# Patient Record
Sex: FEMALE | Race: WHITE | NOT HISPANIC OR LATINO | Employment: OTHER | URBAN - METROPOLITAN AREA
[De-identification: names, ages, dates, MRNs, and addresses within clinical notes are randomized per-mention and may not be internally consistent; named-entity substitution may affect disease eponyms.]

---

## 2018-06-16 ENCOUNTER — HOSPITAL ENCOUNTER (EMERGENCY)
Facility: HOSPITAL | Age: 53
Discharge: HOME | End: 2018-06-16
Attending: EMERGENCY MEDICINE
Payer: COMMERCIAL

## 2018-06-16 VITALS
OXYGEN SATURATION: 100 % | HEART RATE: 78 BPM | SYSTOLIC BLOOD PRESSURE: 141 MMHG | BODY MASS INDEX: 19.49 KG/M2 | DIASTOLIC BLOOD PRESSURE: 82 MMHG | HEIGHT: 63 IN | TEMPERATURE: 98 F | WEIGHT: 110 LBS

## 2018-06-16 DIAGNOSIS — S16.1XXA STRAIN OF NECK MUSCLE, INITIAL ENCOUNTER: ICD-10-CM

## 2018-06-16 DIAGNOSIS — S39.012A BACK STRAIN, INITIAL ENCOUNTER: Primary | ICD-10-CM

## 2018-06-16 DIAGNOSIS — M54.9 BACK PAIN: ICD-10-CM

## 2018-06-16 DIAGNOSIS — M54.2 NECK PAIN: ICD-10-CM
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
## 2018-06-16 DIAGNOSIS — R07.9 CHEST PAIN: ICD-10-CM

## 2018-06-16 PROCEDURE — 99283 EMERGENCY DEPT VISIT LOW MDM: CPT | Mod: 25

## 2018-06-16 RX ORDER — DICLOFENAC SODIUM 50 MG/1
50 TABLET, DELAYED RELEASE ORAL 3 TIMES DAILY PRN
Qty: 30 TABLET | Refills: 0 | Status: SHIPPED | OUTPATIENT
Start: 2018-06-16 | End: 2018-10-03

## 2018-06-16 RX ORDER — METHOCARBAMOL 500 MG/1
1000 TABLET, FILM COATED ORAL 3 TIMES DAILY PRN
Qty: 30 TABLET | Refills: 0 | Status: SHIPPED | OUTPATIENT
Start: 2018-06-16 | End: 2018-06-21
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
## 2018-06-16 NOTE — ED NOTES
Given written and verbal DC instructions questions answered per MD aware to follow up with PCP encouraged to return if needed. Given RX with teaching. Given detailed instructions to rest as needed, drink plenty of water, and listen to her body if it hurts to rest and take medications as directed.

## 2018-07-03 ENCOUNTER — TELEPHONE (OUTPATIENT)
Dept: RHEUMATOLOGY | Facility: CLINIC | Age: 53
End: 2018-07-03

## 2018-07-03 NOTE — TELEPHONE ENCOUNTER
----- Message from Mable Tony sent at 7/3/2018 10:25 AM CDT -----  Contact: Jessica Fitch  Office   Jessica Fitch  Office calling wanting to see of pt can be seen sooner than 10/3,please...[phone]

## 2018-10-03 ENCOUNTER — OFFICE VISIT (OUTPATIENT)
Dept: RHEUMATOLOGY | Facility: CLINIC | Age: 53
End: 2018-10-03

## 2018-10-03 VITALS
HEIGHT: 63 IN | DIASTOLIC BLOOD PRESSURE: 96 MMHG | HEART RATE: 82 BPM | SYSTOLIC BLOOD PRESSURE: 155 MMHG | WEIGHT: 111.13 LBS | BODY MASS INDEX: 19.69 KG/M2

## 2018-10-03 DIAGNOSIS — R53.83 FATIGUE, UNSPECIFIED TYPE: ICD-10-CM

## 2018-10-03 DIAGNOSIS — M35.00 SICCA COMPLEX: ICD-10-CM

## 2018-10-03 DIAGNOSIS — I73.00 RAYNAUD'S PHENOMENON WITHOUT GANGRENE: ICD-10-CM

## 2018-10-03 DIAGNOSIS — M25.50 POLYARTHRALGIA: Primary | ICD-10-CM

## 2018-10-03 DIAGNOSIS — M79.10 MYALGIA: ICD-10-CM

## 2018-10-03 PROCEDURE — 99213 OFFICE O/P EST LOW 20 MIN: CPT | Mod: PBBFAC,PO | Performed by: INTERNAL MEDICINE

## 2018-10-03 PROCEDURE — 99999 PR PBB SHADOW E&M-EST. PATIENT-LVL III: CPT | Mod: PBBFAC,,, | Performed by: INTERNAL MEDICINE

## 2018-10-03 PROCEDURE — 99205 OFFICE O/P NEW HI 60 MIN: CPT | Mod: S$PBB,,, | Performed by: INTERNAL MEDICINE

## 2018-10-03 RX ORDER — ALPRAZOLAM 1 MG/1
1 TABLET ORAL
COMMUNITY
End: 2020-01-13 | Stop reason: SDUPTHER

## 2018-10-03 RX ORDER — AMLODIPINE BESYLATE 5 MG/1
5 TABLET ORAL
COMMUNITY
End: 2020-01-13 | Stop reason: SDUPTHER

## 2018-10-03 RX ORDER — FLUTICASONE PROPIONATE 50 MCG
1 SPRAY, SUSPENSION (ML) NASAL
COMMUNITY
End: 2020-01-13 | Stop reason: SDUPTHER

## 2018-10-03 RX ORDER — PREDNISONE 5 MG/1
TABLET ORAL
COMMUNITY
Start: 2015-03-18 | End: 2018-10-03

## 2018-10-03 RX ORDER — NORGESTIMATE AND ETHINYL ESTRADIOL 0.25-0.035
1 KIT ORAL
COMMUNITY
Start: 2014-06-23 | End: 2023-11-02

## 2018-10-03 RX ORDER — OMEPRAZOLE 20 MG/1
20 TABLET, DELAYED RELEASE ORAL
COMMUNITY
End: 2020-01-13 | Stop reason: SDUPTHER

## 2018-10-03 RX ORDER — TRAMADOL HYDROCHLORIDE 50 MG/1
50 TABLET ORAL
COMMUNITY
End: 2020-01-13 | Stop reason: SDUPTHER

## 2018-10-03 RX ORDER — ZOLPIDEM TARTRATE 5 MG/1
5 TABLET ORAL
COMMUNITY
End: 2019-09-09

## 2018-10-03 RX ORDER — BIOTIN 10 MG
1 TABLET ORAL
COMMUNITY
End: 2018-10-03

## 2018-10-03 RX ORDER — LISINOPRIL 20 MG/1
20 TABLET ORAL
COMMUNITY
End: 2020-01-13 | Stop reason: SDUPTHER

## 2018-10-03 RX ORDER — METHOCARBAMOL 500 MG/1
500 TABLET, FILM COATED ORAL
COMMUNITY
Start: 2015-03-18 | End: 2020-01-13 | Stop reason: SDUPTHER

## 2018-10-03 RX ORDER — LEVOTHYROXINE SODIUM 25 UG/1
25 TABLET ORAL DAILY
COMMUNITY
End: 2021-08-25

## 2018-10-03 RX ORDER — GABAPENTIN 300 MG/1
300 CAPSULE ORAL 3 TIMES DAILY
COMMUNITY
End: 2020-01-13 | Stop reason: SDUPTHER

## 2018-10-03 RX ORDER — LANOLIN ALCOHOL/MO/W.PET/CERES
100 CREAM (GRAM) TOPICAL
COMMUNITY
End: 2018-10-31

## 2018-10-03 ASSESSMENT — ROUTINE ASSESSMENT OF PATIENT INDEX DATA (RAPID3)
MDHAQ FUNCTION SCORE: 1.1
PSYCHOLOGICAL DISTRESS SCORE: 3.3
WHEN YOU AWAKENED IN THE MORNING OVER THE LAST WEEK, PLEASE INDICATE THE AMOUNT OF TIME IT TAKES UNTIL YOU ARE AS LIMBER AS YOU WILL BE FOR THE DAY: FEW MIN
FATIGUE SCORE: 5
PAIN SCORE: 7
PATIENT GLOBAL ASSESSMENT SCORE: 8
TOTAL RAPID3 SCORE: 6.22
AM STIFFNESS SCORE: 1, YES

## 2018-10-03 NOTE — PROGRESS NOTES
"Subjective:       Patient ID: Nataly Traore is a 52 y.o. female.    Chief Complaint: Polyarthralgia   HPI 51 yo female with hypothyroidism and HTN is seen in consultation for joint pain. Saw Dr Muñoz in the past. Also, saw Dr Mccrary in 1990s. Diagnosed with Sjogren's syndrome and Fibromyalgia. Took HCQ in the past- caused rash.   Patient complains of polyarthralgia and myalgia for the last 30 years.Worsened since June 16, 2018. Pain is aching type, continuous, worse as the day progresses, worse with activity, better with rest.  Denies any joint effusion.  Early morning stiffness lasts for 30-60minutes  +SICCA symptoms +photosensitivity  +Raynaud's  She denies fever, weight loss,  rash, ulcers, patchy  alopecia, dysphagia, diarrhea or blood in the stools    Reviewed PMH, FH, SH and past labs    Review of Systems   Constitutional: Positive for fatigue. Negative for fever.   HENT: Negative for ear discharge and ear pain.    Eyes: Negative for pain and redness.   Respiratory: Negative for cough and shortness of breath.    Cardiovascular: Negative for chest pain and palpitations.   Gastrointestinal: Negative for abdominal distention and abdominal pain.   Genitourinary: Negative for genital sores and hematuria.   Musculoskeletal: Positive for arthralgias and myalgias.   Skin: Negative for color change and wound.   Neurological: Negative for tremors and seizures.   Psychiatric/Behavioral: Negative for agitation and hallucinations.         Objective:   BP (!) 155/96   Pulse 82   Ht 5' 3" (1.6 m)   Wt 50.4 kg (111 lb 1.8 oz)   LMP  (Exact Date)   BMI 19.68 kg/m²      Physical Exam   Nursing note and vitals reviewed.  Constitutional: She is oriented to person, place, and time and well-developed, well-nourished, and in no distress.   HENT:   Head: Normocephalic and atraumatic.   Eyes: Conjunctivae and EOM are normal. Pupils are equal, round, and reactive to light.   Neck: Normal range of motion. Neck supple. No " thyromegaly present.   Cardiovascular: Normal rate and regular rhythm.  Exam reveals no friction rub.    Pulmonary/Chest: Effort normal and breath sounds normal.   Abdominal: Soft. Bowel sounds are normal. She exhibits no mass.   Neurological: She is alert and oriented to person, place, and time. She exhibits normal muscle tone.   Skin: Skin is warm and dry.     Psychiatric: Memory and affect normal.   Musculoskeletal: She exhibits no edema.   ROM is within normal limits in all joints including shoulders, elbows, wrists, hands, hips, knees, ankles, feet and spine  Patient is non tender in all joints including shoulders, elbows, wrists, hands, hips, knees, ankles, feet and spine  No joint effusion  Strength is 5/5 in all ext, reflexes are 2+b/l                 Reviewed outside labs 8/30/2018  ESR 41, HB 11.6  WBC 3.3  NEYTROPHILS 2132 LYMPH 878    Radiology: I personally reviewed imaging  X-ray of [place] from 06/16/2018 revealed degenerative changes  Assessment:   Polyarthralgia- Rule out inflammatory arthritis  SICCA symptoms -Check SSA   Diffused myalgia-Rule out myositis  Fibromyalgia   Fatigue-Rule out vitamin d deficiency  Raynaud's phenomenon- primary vs secondary     PLAN:  Reviewed outside labs   Check AVRIL, SSA, RF, CCP, ESR, CRP  Check hand x ray   Check CPK, aldolase, 25 hydroxy Vit D   Check SCL  Patient counseled for Raynaud's- discussed lifestyle modification to keep fingers and toes warm   Return to clinic after lab review    -Patient seen face to face for 60minutes and greater than 50% spent in counseling regarding above diagnosis and chart review

## 2018-10-09 ENCOUNTER — LAB VISIT (OUTPATIENT)
Dept: LAB | Facility: HOSPITAL | Age: 53
End: 2018-10-09
Attending: INTERNAL MEDICINE

## 2018-10-09 DIAGNOSIS — M25.50 POLYARTHRALGIA: ICD-10-CM

## 2018-10-09 LAB
CK SERPL-CCNC: 29 U/L
ERYTHROCYTE [SEDIMENTATION RATE] IN BLOOD BY WESTERGREN METHOD: 20 MM/HR
RHEUMATOID FACT SERPL-ACNC: <10 IU/ML

## 2018-10-09 PROCEDURE — 36415 COLL VENOUS BLD VENIPUNCTURE: CPT

## 2018-10-09 PROCEDURE — 82550 ASSAY OF CK (CPK): CPT

## 2018-10-09 PROCEDURE — 85651 RBC SED RATE NONAUTOMATED: CPT

## 2018-10-09 PROCEDURE — 86200 CCP ANTIBODY: CPT

## 2018-10-09 PROCEDURE — 82306 VITAMIN D 25 HYDROXY: CPT

## 2018-10-09 PROCEDURE — 86235 NUCLEAR ANTIGEN ANTIBODY: CPT | Mod: 59

## 2018-10-09 PROCEDURE — 82085 ASSAY OF ALDOLASE: CPT

## 2018-10-09 PROCEDURE — 86431 RHEUMATOID FACTOR QUANT: CPT

## 2018-10-09 PROCEDURE — 86038 ANTINUCLEAR ANTIBODIES: CPT

## 2018-10-09 PROCEDURE — 86235 NUCLEAR ANTIGEN ANTIBODY: CPT

## 2018-10-10 LAB
25(OH)D3+25(OH)D2 SERPL-MCNC: 45 NG/ML
ALDOLASE SERPL-CCNC: 2.7 U/L
ANA SER QL IF: NORMAL
ANTI-SSA ANTIBODY: 51.68 EU
ANTI-SSA INTERPRETATION: POSITIVE
CCP AB SER IA-ACNC: <0.5 U/ML

## 2018-10-11 ENCOUNTER — TELEPHONE (OUTPATIENT)
Dept: RHEUMATOLOGY | Facility: CLINIC | Age: 53
End: 2018-10-11

## 2018-10-11 LAB — ENA SCL70 AB SER-ACNC: 7 UNITS

## 2018-10-11 NOTE — TELEPHONE ENCOUNTER
Spoke to pt informed verbalizes understanding states will have hand xrays completed soon and fax results to our office as she is cash pay and [facility] wanted to much. Pt also states she will call back to schedule as she is currently driving and dont have schedule available

## 2018-10-11 NOTE — TELEPHONE ENCOUNTER
----- Message from Dariana Leger MD sent at 10/11/2018  2:21 PM CDT -----  Crystal, Please let the patient know that results are positive for Sjogren's syndrome. RTC in 2 months to discuss further. Thanks SS

## 2018-10-31 ENCOUNTER — OFFICE VISIT (OUTPATIENT)
Dept: RHEUMATOLOGY | Facility: CLINIC | Age: 53
End: 2018-10-31

## 2018-10-31 VITALS
HEART RATE: 85 BPM | DIASTOLIC BLOOD PRESSURE: 90 MMHG | SYSTOLIC BLOOD PRESSURE: 129 MMHG | WEIGHT: 109.81 LBS | HEIGHT: 63 IN | BODY MASS INDEX: 19.46 KG/M2

## 2018-10-31 DIAGNOSIS — M35.01 SJOGREN'S SYNDROME WITH KERATOCONJUNCTIVITIS SICCA: ICD-10-CM

## 2018-10-31 DIAGNOSIS — D72.819 LEUKOPENIA, UNSPECIFIED TYPE: Primary | ICD-10-CM

## 2018-10-31 DIAGNOSIS — I73.81 ERYTHROMELALGIA: ICD-10-CM

## 2018-10-31 PROCEDURE — 99214 OFFICE O/P EST MOD 30 MIN: CPT | Mod: S$PBB,,, | Performed by: INTERNAL MEDICINE

## 2018-10-31 PROCEDURE — 99999 PR PBB SHADOW E&M-EST. PATIENT-LVL III: CPT | Mod: PBBFAC,,, | Performed by: INTERNAL MEDICINE

## 2018-10-31 PROCEDURE — 99213 OFFICE O/P EST LOW 20 MIN: CPT | Mod: PBBFAC,PO | Performed by: INTERNAL MEDICINE

## 2018-10-31 ASSESSMENT — ROUTINE ASSESSMENT OF PATIENT INDEX DATA (RAPID3)
WHEN YOU AWAKENED IN THE MORNING OVER THE LAST WEEK, PLEASE INDICATE THE AMOUNT OF TIME IT TAKES UNTIL YOU ARE AS LIMBER AS YOU WILL BE FOR THE DAY: 30 MIN-1 HR
PSYCHOLOGICAL DISTRESS SCORE: 4.4
AM STIFFNESS SCORE: 1, YES
TOTAL RAPID3 SCORE: 6.33
FATIGUE SCORE: 9
MDHAQ FUNCTION SCORE: 1.2
PATIENT GLOBAL ASSESSMENT SCORE: 7.5
PAIN SCORE: 7.5

## 2018-10-31 NOTE — PROGRESS NOTES
"Subjective:       Patient ID: Nataly Traore is a 52 y.o. female.    Chief Complaint: Sjogren syndrome  HPI 51 yo female with hypothyroidism and HTN is  Here for follow-up for Sjogren syndrome.Saw Dr Muñoz in the past. Also, saw Dr Mccrary in 1990s. Diagnosed with Sjogren's syndrome and Fibromyalgia. Took HCQ in the past- caused rash.      Today, she is concerned about symptoms of Erythromelalgia.  Complains of episodes of hand redness.During episodes,  Distal upper extremitiesbecome red, hot, and painful  Accompanied by swelling. The episodes are intermittent and generally last for minutes to hours before resolving.   +SICCA symptoms   +photosensitivity  +Raynaud's  She denies fever, weight loss, ulcers, patchy  alopecia, dysphagia, diarrhea or blood in the stools        Review of Systems   HENT: Negative for ear discharge and ear pain.    Eyes: Negative for pain and redness.   Respiratory: Negative for cough and shortness of breath.    Cardiovascular: Negative for chest pain and palpitations.   Gastrointestinal: Negative for abdominal distention and abdominal pain.   Genitourinary: Negative for genital sores and hematuria.   Musculoskeletal: Positive for arthralgias. Negative for joint swelling.   Skin: Positive for color change. Negative for wound.         Objective:   BP (!) 129/90 (BP Location: Left arm, Patient Position: Sitting)   Pulse 85   Ht 5' 3" (1.6 m)   Wt 49.8 kg (109 lb 12.6 oz)   LMP 10/09/2018   BMI 19.45 kg/m²      Physical Exam   Nursing note and vitals reviewed.  Constitutional: She is oriented to person, place, and time and well-developed, well-nourished, and in no distress.   HENT:   Head: Normocephalic and atraumatic.   Eyes: Conjunctivae and EOM are normal. Pupils are equal, round, and reactive to light.   Neck: Normal range of motion. Neck supple. No thyromegaly present.   Cardiovascular: Normal rate and regular rhythm.  Exam reveals no friction rub.    Pulmonary/Chest: Effort normal " and breath sounds normal.   Abdominal: Soft. Bowel sounds are normal.   Neurological: She is alert and oriented to person, place, and time.   Skin: Skin is warm and dry. No rash noted.     Psychiatric: Memory and affect normal.   Musculoskeletal: She exhibits no edema.     No joint effusion                     Lab Results   Component Value Date    SEDRATE 20 10/09/2018     No results found for: CRP  Results for ALISSA ROBLES (MRN [id number]) as of 10/31/2018 14:16   Ref. Range 10/9/2018 14:17 10/9/2018 16:21   Sed Rate Latest Ref Range: 0 - 20 mm/Hr 20    CPK Latest Ref Range: 20 - 180 U/L  29   Vit D, 25-Hydroxy Latest Ref Range: 30 - 96 ng/mL 45    Anti-SSA Antibody Latest Ref Range: 0.00 - 19.99 EU 51.68 (H)    Anti-SSA Interpretation Latest Ref Range: Negative  Positive (A)    Scleroderma SCL- Latest Ref Range: <20 UNITS 7    CCP Antibodies Latest Ref Range: <5.0 U/mL <0.5    Rheumatoid Factor Latest Ref Range: 0.0 - 15.0 IU/mL <10.0    Aldolase Latest Ref Range: 1.2 - 7.6 U/L 2.7    AVRIL Screen Latest Ref Range: Negative <1:160  Negative <1:160    Outside labs 8/30/2018  ESR 41, HB 11.6  WBC 3.3  NEYTROPHILS 2132 LYMPH 878    X-ray of Lspine from 06/16/2018 revealed degenerative changes  Assessment:    Erythromelalgia  + leukopenia- hematology referral   Sjogren's syndrome-SICCA symptoms+ SSA 51.68  Fibromyalgia   Raynaud's phenomenon- primary vs secondary    Plaquenil allergy      PLAN:    Discussed results with the patient, answered all her questions  Await  And x-rays  check C3-C4 UP/C, SPEP, IFA   hematology referral   consider aspirin 81 mg daily for Erythromelalgia  Patient counseled for Raynaud's- discussed lifestyle modification to keep fingers and toes warm    educated about Sjogren syndrome   advised to see a dentist every 6 months   patient instructions:  For dry mouth, use:  Sugar-free hard candies or lozenges, Sugar-free chewing gums, Citrus-flavored sugarless tablets or oral drops, and  Maltose lozenges    Dry skin is more than just unsightly, it is unhealthy and can make you itch.  Bacteria can enter the little cracks in dry skin and cause skin infection and swelling called cellulitis.  How to help dry skin:    1. Limit your baths and showers.  No more than 3 weekly.  2. No soap, except underarms, and privates.  If you have grease or grime on your skin, you may use it just on those spots.  3. Use cooler water, the hot water melts away your natural skin lubrication.  4. After your bath or shower, pat yourself dry, don't rub.  5. After drying apply a light vegetable oil like grapeseed oil or olive oil and massage it in well.   Grapeseed oil can be purchased at the local [employer] stores.    Return to clinic  In 3 months

## 2018-10-31 NOTE — PATIENT INSTRUCTIONS
For dry mouth, use:  Sugar-free hard candies or lozenges, Sugar-free chewing gums, Citrus-flavored sugarless tablets or oral drops, and Maltose lozenges    Dry skin is more than just unsightly, it is unhealthy and can make you itch.  Bacteria can enter the little cracks in dry skin and cause skin infection and swelling called cellulitis.  How to help dry skin:    1. Limit your baths and showers.  No more than 3 weekly.  2. No soap, except underarms, and privates.  If you have grease or grime on your skin, you may use it just on those spots.  3. Use cooler water, the hot water melts away your natural skin lubrication.  4. After your bath or shower, pat yourself dry, don't rub.  5. After drying apply a light vegetable oil like grapeseed oil or olive oil and massage it in well.   Grapeseed oil can be purchased at the local [employer] stores.

## 2018-11-30 ENCOUNTER — OFFICE VISIT (OUTPATIENT)
Dept: HEMATOLOGY/ONCOLOGY | Facility: CLINIC | Age: 53
End: 2018-11-30

## 2018-11-30 VITALS
HEIGHT: 61 IN | BODY MASS INDEX: 20.92 KG/M2 | SYSTOLIC BLOOD PRESSURE: 123 MMHG | TEMPERATURE: 98 F | DIASTOLIC BLOOD PRESSURE: 80 MMHG | HEART RATE: 88 BPM | WEIGHT: 110.81 LBS | RESPIRATION RATE: 20 BRPM

## 2018-11-30 DIAGNOSIS — D72.819 LEUKOPENIA, UNSPECIFIED TYPE: ICD-10-CM

## 2018-11-30 PROCEDURE — 99202 OFFICE O/P NEW SF 15 MIN: CPT | Mod: ,,, | Performed by: INTERNAL MEDICINE
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
## 2018-11-30 NOTE — ASSESSMENT & PLAN NOTE
Patient's WBC is slightly low at 3.3 and she has chronic anemia.  I don't see anything in her history, physical exam or lab work that appears malignant.  At this point I don't feel flow cytometry or CT imaging is needed but that the WBC should be followed.  I discussed with her that I would check this again in January and would like to see her again in six months with a second CBC.  Discussed this in detail today.

## 2018-11-30 NOTE — LETTER
November 30, 2018      Dariana Leger MD  [address]  [place] [address]           [facility]  [address]  [address]  [place] [address]  Phone: [phone]  Fax: [phone]          Patient: Nataly Traore   MR Number: [id number]   YOB: 1965   Date of Visit: 11/30/2018       Dear Dr. Dariana Leger:    Thank you for referring Nataly Traore to me for evaluation. Attached you will find relevant portions of my assessment and plan of care.    If you have questions, please do not hesitate to call me. I look forward to following Nataly Traore along with you.    Sincerely,    Juan Beahc MD    Enclosure  CC:  No Recipients    If you would like to receive this communication electronically, please contact [email or web address] or [phone] to request more information on [employer] access.    For providers and/or their staff who would like to refer a patient to [facility], please contact us through our one-stop-shop provider referral line, [facility], at [phone].    If you feel you have received this communication in error or would no longer like to receive these types of communications, please e-mail [email or web address]

## 2019-01-17 ENCOUNTER — LAB VISIT (OUTPATIENT)
Dept: LAB | Facility: HOSPITAL | Age: 54
End: 2019-01-17
Attending: INTERNAL MEDICINE

## 2019-01-17 DIAGNOSIS — M35.01 SJOGREN'S SYNDROME WITH KERATOCONJUNCTIVITIS SICCA: ICD-10-CM

## 2019-01-17 LAB
BILIRUB UR QL STRIP: NEGATIVE
CLARITY UR REFRACT.AUTO: CLEAR
COLOR UR AUTO: NORMAL
GLUCOSE UR QL STRIP: NEGATIVE
HGB UR QL STRIP: NEGATIVE
KETONES UR QL STRIP: NEGATIVE
LEUKOCYTE ESTERASE UR QL STRIP: NEGATIVE
NITRITE UR QL STRIP: NEGATIVE
PH UR STRIP: 6 [PH] (ref 5–8)
PROT UR QL STRIP: NEGATIVE
SP GR UR STRIP: 1.01 (ref 1–1.03)
URN SPEC COLLECT METH UR: NORMAL

## 2019-01-17 PROCEDURE — 82570 ASSAY OF URINE CREATININE: CPT

## 2019-01-17 PROCEDURE — 81003 URINALYSIS AUTO W/O SCOPE: CPT

## 2019-01-18 ENCOUNTER — LAB VISIT (OUTPATIENT)
Dept: LAB | Facility: HOSPITAL | Age: 54
End: 2019-01-18
Attending: INTERNAL MEDICINE

## 2019-01-18 DIAGNOSIS — M25.50 POLYARTHRALGIA: ICD-10-CM

## 2019-01-18 DIAGNOSIS — M35.01 SJOGREN'S SYNDROME WITH KERATOCONJUNCTIVITIS SICCA: ICD-10-CM

## 2019-01-18 LAB
C3 SERPL-MCNC: 121 MG/DL
C4 SERPL-MCNC: 14 MG/DL
CREAT UR-MCNC: 24 MG/DL
CRP SERPL-MCNC: 2.1 MG/L
PROT UR-MCNC: <7 MG/DL
PROT/CREAT UR: NORMAL MG/G{CREAT}

## 2019-01-18 PROCEDURE — 83520 IMMUNOASSAY QUANT NOS NONAB: CPT

## 2019-01-18 PROCEDURE — 86140 C-REACTIVE PROTEIN: CPT

## 2019-01-18 PROCEDURE — 86334 IMMUNOFIX E-PHORESIS SERUM: CPT | Mod: 26,,, | Performed by: PATHOLOGY

## 2019-01-18 PROCEDURE — 82595 ASSAY OF CRYOGLOBULIN: CPT

## 2019-01-18 PROCEDURE — 86160 COMPLEMENT ANTIGEN: CPT

## 2019-01-18 PROCEDURE — 86160 COMPLEMENT ANTIGEN: CPT | Mod: 59

## 2019-01-18 PROCEDURE — 84165 PATHOLOGIST INTERPRETATION SPE: ICD-10-PCS | Mod: 26,,, | Performed by: PATHOLOGY

## 2019-01-18 PROCEDURE — 84165 PROTEIN E-PHORESIS SERUM: CPT

## 2019-01-18 PROCEDURE — 84165 PROTEIN E-PHORESIS SERUM: CPT | Mod: 26,,, | Performed by: PATHOLOGY

## 2019-01-18 PROCEDURE — 36415 COLL VENOUS BLD VENIPUNCTURE: CPT | Mod: PO

## 2019-01-18 PROCEDURE — 86334 IMMUNOFIX E-PHORESIS SERUM: CPT

## 2019-01-18 PROCEDURE — 86334 PATHOLOGIST INTERPRETATION IFE: ICD-10-PCS | Mod: 26,,, | Performed by: PATHOLOGY

## 2019-01-21 LAB
ALBUMIN SERPL ELPH-MCNC: 3.96 G/DL
ALPHA1 GLOB SERPL ELPH-MCNC: 0.32 G/DL
ALPHA2 GLOB SERPL ELPH-MCNC: 0.76 G/DL
B-GLOBULIN SERPL ELPH-MCNC: 0.74 G/DL
GAMMA GLOB SERPL ELPH-MCNC: 1.23 G/DL
INTERPRETATION SERPL IFE-IMP: NORMAL
PATHOLOGIST INTERPRETATION IFE: NORMAL
PATHOLOGIST INTERPRETATION SPE: NORMAL
PROT SERPL-MCNC: 7 G/DL

## 2019-01-22 ENCOUNTER — OFFICE VISIT (OUTPATIENT)
Dept: RHEUMATOLOGY | Facility: CLINIC | Age: 54
End: 2019-01-22

## 2019-01-22 VITALS — BODY MASS INDEX: 21.94 KG/M2 | WEIGHT: 116.19 LBS | HEIGHT: 61 IN

## 2019-01-22 DIAGNOSIS — I73.00 RAYNAUD'S DISEASE WITHOUT GANGRENE: ICD-10-CM

## 2019-01-22 DIAGNOSIS — M25.50 POLYARTHRALGIA: Primary | ICD-10-CM

## 2019-01-22 DIAGNOSIS — M35.01 SJOGREN'S SYNDROME WITH KERATOCONJUNCTIVITIS SICCA: ICD-10-CM

## 2019-01-22 LAB — RNA POLYMERASE III ANTIBODIES, IGG, SERUM: <10 U

## 2019-01-22 PROCEDURE — 99213 OFFICE O/P EST LOW 20 MIN: CPT | Mod: PBBFAC,PO | Performed by: INTERNAL MEDICINE

## 2019-01-22 PROCEDURE — 99999 PR PBB SHADOW E&M-EST. PATIENT-LVL III: ICD-10-PCS | Mod: PBBFAC,,, | Performed by: INTERNAL MEDICINE

## 2019-01-22 PROCEDURE — 99214 PR OFFICE/OUTPT VISIT, EST, LEVL IV, 30-39 MIN: ICD-10-PCS | Mod: S$PBB,,, | Performed by: INTERNAL MEDICINE

## 2019-01-22 PROCEDURE — 99999 PR PBB SHADOW E&M-EST. PATIENT-LVL III: CPT | Mod: PBBFAC,,, | Performed by: INTERNAL MEDICINE

## 2019-01-22 PROCEDURE — 99214 OFFICE O/P EST MOD 30 MIN: CPT | Mod: S$PBB,,, | Performed by: INTERNAL MEDICINE

## 2019-01-22 RX ORDER — ZOLPIDEM TARTRATE 10 MG/1
TABLET ORAL
COMMUNITY
Start: 2015-01-27 | End: 2020-01-13 | Stop reason: SDUPTHER

## 2019-01-22 RX ORDER — DEXTROAMPHETAMINE SACCHARATE, AMPHETAMINE ASPARTATE, DEXTROAMPHETAMINE SULFATE AND AMPHETAMINE SULFATE 7.5; 7.5; 7.5; 7.5 MG/1; MG/1; MG/1; MG/1
TABLET ORAL
COMMUNITY
Start: 2018-12-10 | End: 2019-09-30 | Stop reason: SDUPTHER

## 2019-01-22 ASSESSMENT — ROUTINE ASSESSMENT OF PATIENT INDEX DATA (RAPID3)
MDHAQ FUNCTION SCORE: 1.2
PAIN SCORE: 7.5
FATIGUE SCORE: 9
AM STIFFNESS SCORE: 1, YES
PSYCHOLOGICAL DISTRESS SCORE: 5.5
PATIENT GLOBAL ASSESSMENT SCORE: 7.5
TOTAL RAPID3 SCORE: 6.33
WHEN YOU AWAKENED IN THE MORNING OVER THE LAST WEEK, PLEASE INDICATE THE AMOUNT OF TIME IT TAKES UNTIL YOU ARE AS LIMBER AS YOU WILL BE FOR THE DAY: 1

## 2019-01-22 NOTE — PROGRESS NOTES
"Subjective:       Patient ID: Nataly Traore is a 53 y.o. female.    Chief Complaint: Sjogren syndrome  HPI 54 yo female with hypothyroidism and HTN is  Here for follow-up for Sjogren syndrome.Saw Dr Muñoz in the past. Also, saw Dr Mccrary in 1990s. Diagnosed with Sjogren's syndrome and Fibromyalgia. Took HCQ in the past- caused rash.     Since the visit, she has been evaluated by Dr Beach. No concerns of hematologic malignancy.    Today, complains of diffuse polyarthralgia- 7.5/10, intermittent, worse with activity, no joint swelling.   +SICCA symptoms  +Raynaud's  She denies fever, weight loss, ulcers, patchy  alopecia, dysphagia, diarrhea or blood in the stools        Review of Systems   Constitutional: Positive for fatigue. Negative for fever and unexpected weight change.   HENT: Negative for ear discharge and ear pain.    Eyes: Negative for pain and redness.   Respiratory: Negative for cough and shortness of breath.    Cardiovascular: Negative for chest pain and palpitations.   Gastrointestinal: Negative for abdominal distention and abdominal pain.   Endocrine: Negative for polyphagia and polyuria.   Genitourinary: Negative for genital sores and hematuria.   Musculoskeletal: Positive for arthralgias. Negative for joint swelling.   Skin: Positive for color change. Negative for wound.         Objective:   Ht 5' 0.58" (1.539 m)   Wt 52.7 kg (116 lb 2.9 oz)   BMI 22.26 kg/m²      Physical Exam   Nursing note and vitals reviewed.  Constitutional: She is oriented to person, place, and time and well-developed, well-nourished, and in no distress.   HENT:   Head: Normocephalic and atraumatic.   Eyes: Conjunctivae and EOM are normal. Pupils are equal, round, and reactive to light.   Neck: Normal range of motion. Neck supple. No thyromegaly present.   Cardiovascular: Normal rate and regular rhythm.  Exam reveals no friction rub.    Pulmonary/Chest: Effort normal and breath sounds normal.   Abdominal: Soft. Bowel " sounds are normal.   Neurological: She is alert and oriented to person, place, and time.   Skin: Skin is warm and dry. No rash noted.     No digital ulcers    Psychiatric: Memory and affect normal.   Musculoskeletal: She exhibits no edema.   No joint effusion                     Lab Results   Component Value Date    SEDRATE 20 10/09/2018     Lab Results   Component Value Date    CRP 2.1 01/18/2019     Results for ALISSA ROBLES (MRN [id number]) as of 10/31/2018 14:16   Ref. Range 10/9/2018 14:17 10/9/2018 16:21   Sed Rate Latest Ref Range: 0 - 20 mm/Hr 20    CPK Latest Ref Range: 20 - 180 U/L  29   Vit D, 25-Hydroxy Latest Ref Range: 30 - 96 ng/mL 45    Anti-SSA Antibody Latest Ref Range: 0.00 - 19.99 EU 51.68 (H)    Anti-SSA Interpretation Latest Ref Range: Negative  Positive (A)    Scleroderma SCL- Latest Ref Range: <20 UNITS 7    CCP Antibodies Latest Ref Range: <5.0 U/mL <0.5    Rheumatoid Factor Latest Ref Range: 0.0 - 15.0 IU/mL <10.0    Aldolase Latest Ref Range: 1.2 - 7.6 U/L 2.7    AVRIL Screen Latest Ref Range: Negative <1:160  Negative <1:160    Outside labs 8/30/2018  ESR 41, HB 11.6  WBC 3.3  NEYTROPHILS 2132 LYMPH 878    X-ray of Lspine from 06/16/2018 revealed degenerative changes  Assessment:    Polyarthralgia- Check NM joint scan   Sjogren's syndrome-SICCA symptoms+ SSA 51.68  Fibromyalgia   Raynaud's phenomenon  Plaquenil allergy      PLAN:  Obtain x ray hands  Check NM joint scan   Start aspirin 81 mg daily   Continue Norvasc 5mg daily   Counseled about Raynaud's.  Discussed lifestyle modification to keep finger isn't sure  Consider discontinuing Adderall   Return to clinic in 3-4 months

## 2019-01-22 NOTE — PATIENT INSTRUCTIONS
To schedule Joint Scan please call [phone]    Dr. Leger is out of office on Friday, Saturday and Sundays!    Please complete any and all labs Monday thru Thursdays.    Contact our office at [phone] for any assistance

## 2019-01-28 LAB — CRYOGLOB SER QL: NORMAL

## 2019-03-01 LAB — HCV AB SER-ACNC: NEGATIVE

## 2019-05-23 ENCOUNTER — TELEPHONE (OUTPATIENT)
Dept: HEMATOLOGY/ONCOLOGY | Facility: CLINIC | Age: 54
End: 2019-05-23

## 2019-05-23 NOTE — TELEPHONE ENCOUNTER
Called to see if the pt had any labs done prior to f/u appt. On 5/29. Orders for CBC auto. Left message on VM.

## 2019-06-28 ENCOUNTER — TELEPHONE (OUTPATIENT)
Dept: HEMATOLOGY/ONCOLOGY | Facility: CLINIC | Age: 54
End: 2019-06-28

## 2019-06-28 NOTE — TELEPHONE ENCOUNTER
Called to see if the pt had any labs done prior to f/u appt.    LM for the pt to do the labs @ [facility].

## 2019-09-09 ENCOUNTER — OFFICE VISIT (OUTPATIENT)
Dept: HEMATOLOGY/ONCOLOGY | Facility: CLINIC | Age: 54
End: 2019-09-09

## 2019-09-09 VITALS
HEART RATE: 92 BPM | DIASTOLIC BLOOD PRESSURE: 83 MMHG | TEMPERATURE: 99 F | SYSTOLIC BLOOD PRESSURE: 122 MMHG | WEIGHT: 111.31 LBS | RESPIRATION RATE: 18 BRPM | BODY MASS INDEX: 21.32 KG/M2

## 2019-09-09 DIAGNOSIS — D72.819 LEUKOPENIA, UNSPECIFIED TYPE: ICD-10-CM

## 2019-09-09 PROCEDURE — 99212 PR OFFICE/OUTPT VISIT, EST, LEVL II, 10-19 MIN: ICD-10-PCS | Mod: S$GLB,,, | Performed by: INTERNAL MEDICINE

## 2019-09-09 PROCEDURE — 99212 OFFICE O/P EST SF 10 MIN: CPT | Mod: S$GLB,,, | Performed by: INTERNAL MEDICINE
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
## 2019-09-09 NOTE — ASSESSMENT & PLAN NOTE
WBC is stable at this time and I suspect this is her normal and would describe this as normal variant.  She continues to have symptoms of fatigue and weakness but does has rheumatologic  Problems which would explain this.  I can see patient yearly at this time with CBC.

## 2019-09-24 LAB — CRC RECOMMENDATION EXT: NORMAL

## 2019-09-30 DIAGNOSIS — F90.0 ADHD, PREDOMINANTLY INATTENTIVE TYPE: Primary | ICD-10-CM

## 2019-09-30 RX ORDER — DEXTROAMPHETAMINE SACCHARATE, AMPHETAMINE ASPARTATE, DEXTROAMPHETAMINE SULFATE AND AMPHETAMINE SULFATE 7.5; 7.5; 7.5; 7.5 MG/1; MG/1; MG/1; MG/1
1 TABLET ORAL DAILY
Qty: 30 TABLET | Refills: 0 | Status: SHIPPED | OUTPATIENT
Start: 2019-09-30 | End: 2020-01-13 | Stop reason: SDUPTHER

## 2019-09-30 RX ORDER — DEXTROAMPHETAMINE SACCHARATE, AMPHETAMINE ASPARTATE, DEXTROAMPHETAMINE SULFATE AND AMPHETAMINE SULFATE 7.5; 7.5; 7.5; 7.5 MG/1; MG/1; MG/1; MG/1
1 TABLET ORAL DAILY
Qty: 30 TABLET | Refills: 0 | Status: SHIPPED | OUTPATIENT
Start: 2019-11-30 | End: 2020-01-13 | Stop reason: SDUPTHER

## 2019-09-30 RX ORDER — DEXTROAMPHETAMINE SACCHARATE, AMPHETAMINE ASPARTATE, DEXTROAMPHETAMINE SULFATE AND AMPHETAMINE SULFATE 7.5; 7.5; 7.5; 7.5 MG/1; MG/1; MG/1; MG/1
1 TABLET ORAL DAILY
Qty: 30 TABLET | Refills: 0 | Status: SHIPPED | OUTPATIENT
Start: 2019-10-30 | End: 2020-01-13 | Stop reason: SDUPTHER

## 2019-09-30 NOTE — TELEPHONE ENCOUNTER
----- Message from Erin Alvarez sent at 9/30/2019  9:46 AM CDT -----  Contact: Nataly Linkill adderall 30 mg. Can you call in 3 separate Rx for 3 months. [facility] [address] # [phone] pts # [phone] GH

## 2020-01-12 DIAGNOSIS — I73.00 RAYNAUD'S DISEASE WITHOUT GANGRENE: Primary | ICD-10-CM

## 2020-01-13 ENCOUNTER — OFFICE VISIT (OUTPATIENT)
Dept: FAMILY MEDICINE | Facility: CLINIC | Age: 55
End: 2020-01-13

## 2020-01-13 VITALS
HEIGHT: 63 IN | BODY MASS INDEX: 19.67 KG/M2 | SYSTOLIC BLOOD PRESSURE: 114 MMHG | WEIGHT: 111 LBS | DIASTOLIC BLOOD PRESSURE: 74 MMHG | HEART RATE: 84 BPM

## 2020-01-13 DIAGNOSIS — F41.9 ANXIETY: ICD-10-CM

## 2020-01-13 DIAGNOSIS — J30.9 ALLERGIC RHINITIS, UNSPECIFIED SEASONALITY, UNSPECIFIED TRIGGER: ICD-10-CM

## 2020-01-13 DIAGNOSIS — I10 HYPERTENSION, UNSPECIFIED TYPE: ICD-10-CM

## 2020-01-13 DIAGNOSIS — F90.0 ADHD, PREDOMINANTLY INATTENTIVE TYPE: ICD-10-CM

## 2020-01-13 DIAGNOSIS — K21.9 GASTROESOPHAGEAL REFLUX DISEASE, ESOPHAGITIS PRESENCE NOT SPECIFIED: ICD-10-CM

## 2020-01-13 DIAGNOSIS — G62.9 NEUROPATHY: Primary | ICD-10-CM

## 2020-01-13 DIAGNOSIS — G47.00 INSOMNIA, UNSPECIFIED TYPE: ICD-10-CM

## 2020-01-13 DIAGNOSIS — M25.50 POLYARTHRALGIA: ICD-10-CM

## 2020-01-13 DIAGNOSIS — E03.9 HYPOTHYROIDISM, UNSPECIFIED TYPE: ICD-10-CM

## 2020-01-13 PROCEDURE — 99214 OFFICE O/P EST MOD 30 MIN: CPT | Mod: S$GLB,,, | Performed by: NURSE PRACTITIONER

## 2020-01-13 PROCEDURE — 99214 PR OFFICE/OUTPT VISIT, EST, LEVL IV, 30-39 MIN: ICD-10-PCS | Mod: S$GLB,,, | Performed by: NURSE PRACTITIONER

## 2020-01-13 RX ORDER — METHYLPREDNISOLONE 4 MG/1
TABLET ORAL
Qty: 1 PACKAGE | Refills: 0 | Status: SHIPPED | OUTPATIENT
Start: 2020-01-13 | End: 2020-02-03

## 2020-01-13 RX ORDER — OMEPRAZOLE 20 MG/1
20 TABLET, DELAYED RELEASE ORAL EVERY MORNING
Qty: 90 TABLET | Refills: 1 | Status: SHIPPED | OUTPATIENT
Start: 2020-01-13 | End: 2021-03-09 | Stop reason: SDUPTHER

## 2020-01-13 RX ORDER — FLUTICASONE PROPIONATE 50 MCG
1 SPRAY, SUSPENSION (ML) NASAL DAILY
Qty: 1 BOTTLE | Refills: 5 | Status: SHIPPED | OUTPATIENT
Start: 2020-01-13 | End: 2021-03-09 | Stop reason: SDUPTHER

## 2020-01-13 RX ORDER — AMOXICILLIN AND CLAVULANATE POTASSIUM 875; 125 MG/1; MG/1
1 TABLET, FILM COATED ORAL EVERY 12 HOURS
Qty: 20 TABLET | Refills: 0 | Status: SHIPPED | OUTPATIENT
Start: 2020-01-13 | End: 2021-03-09

## 2020-01-13 RX ORDER — ZOLPIDEM TARTRATE 10 MG/1
TABLET ORAL
Qty: 90 TABLET | Refills: 1 | Status: SHIPPED | OUTPATIENT
Start: 2020-01-13 | End: 2021-03-09 | Stop reason: SDUPTHER

## 2020-01-13 RX ORDER — DEXTROAMPHETAMINE SACCHARATE, AMPHETAMINE ASPARTATE, DEXTROAMPHETAMINE SULFATE AND AMPHETAMINE SULFATE 7.5; 7.5; 7.5; 7.5 MG/1; MG/1; MG/1; MG/1
1 TABLET ORAL DAILY
Qty: 30 TABLET | Refills: 0 | Status: SHIPPED | OUTPATIENT
Start: 2020-03-13 | End: 2020-04-22 | Stop reason: SDUPTHER

## 2020-01-13 RX ORDER — DEXTROAMPHETAMINE SACCHARATE, AMPHETAMINE ASPARTATE, DEXTROAMPHETAMINE SULFATE AND AMPHETAMINE SULFATE 7.5; 7.5; 7.5; 7.5 MG/1; MG/1; MG/1; MG/1
1 TABLET ORAL DAILY
Qty: 30 TABLET | Refills: 0 | Status: SHIPPED | OUTPATIENT
Start: 2020-02-13 | End: 2021-03-09 | Stop reason: SDUPTHER

## 2020-01-13 RX ORDER — ALPRAZOLAM 1 MG/1
1 TABLET ORAL 2 TIMES DAILY PRN
Qty: 180 TABLET | Refills: 1 | Status: SHIPPED | OUTPATIENT
Start: 2020-01-13 | End: 2021-03-09 | Stop reason: SDUPTHER

## 2020-01-13 RX ORDER — METHOCARBAMOL 500 MG/1
500 TABLET, FILM COATED ORAL 3 TIMES DAILY
Qty: 270 TABLET | Refills: 1 | Status: SHIPPED | OUTPATIENT
Start: 2020-01-13 | End: 2021-03-09 | Stop reason: SDUPTHER

## 2020-01-13 RX ORDER — AMLODIPINE BESYLATE 5 MG/1
5 TABLET ORAL 2 TIMES DAILY
Qty: 180 TABLET | Refills: 1 | Status: SHIPPED | OUTPATIENT
Start: 2020-01-13 | End: 2021-03-09 | Stop reason: SDUPTHER

## 2020-01-13 RX ORDER — DEXTROAMPHETAMINE SACCHARATE, AMPHETAMINE ASPARTATE, DEXTROAMPHETAMINE SULFATE AND AMPHETAMINE SULFATE 7.5; 7.5; 7.5; 7.5 MG/1; MG/1; MG/1; MG/1
1 TABLET ORAL DAILY
Qty: 30 TABLET | Refills: 0 | Status: SHIPPED | OUTPATIENT
Start: 2020-01-13 | End: 2021-08-25

## 2020-01-13 RX ORDER — TRAMADOL HYDROCHLORIDE 50 MG/1
50 TABLET ORAL EVERY 8 HOURS PRN
Qty: 60 TABLET | Refills: 0 | Status: SHIPPED | OUTPATIENT
Start: 2020-01-13 | End: 2020-02-12

## 2020-01-13 RX ORDER — GABAPENTIN 300 MG/1
300 CAPSULE ORAL 3 TIMES DAILY
Qty: 270 CAPSULE | Refills: 1 | Status: SHIPPED | OUTPATIENT
Start: 2020-01-13 | End: 2021-03-09 | Stop reason: SDUPTHER

## 2020-01-13 RX ORDER — LISINOPRIL 20 MG/1
20 TABLET ORAL DAILY
Qty: 90 TABLET | Refills: 1 | Status: SHIPPED | OUTPATIENT
Start: 2020-01-13 | End: 2021-03-09 | Stop reason: SDUPTHER

## 2020-01-13 NOTE — PROGRESS NOTES
SUBJECTIVE:    Patient ID: Nataly Traore is a 54 y.o. female.    Chief Complaint: Knee Pain (since Friday, brought bottles// SW); Sore Throat (lymph nodes swollen// SW); Cough (joints swollen// SW); and Otalgia (feels full, wants printed scripts// SW)    54 year old female      No visits with results within 6 Month(s) from this visit.   Latest known visit with results is:   Lab Visit on 01/18/2019   Component Date Value Ref Range Status    CRP 01/18/2019 2.1  0.0 - 8.2 mg/L Final    Complement (C-3) 01/18/2019 121  50 - 180 mg/dL Final    Complement (C-4) 01/18/2019 14  11 - 44 mg/dL Final    Immunofix Interp. 01/18/2019 SEE COMMENT   Final    Protein, Serum 01/18/2019 7.0  6.0 - 8.4 g/dL Final    Albumin grams/dl 01/18/2019 3.96  3.35 - 5.55 g/dL Final    Alpha-1 grams/dl 01/18/2019 0.32  0.17 - 0.41 g/dL Final    Alpha-2 grams/dl 01/18/2019 0.76  0.43 - 0.99 g/dL Final    Beta grams/dl 01/18/2019 0.74  0.50 - 1.10 g/dL Final    Gamma grams/dl 01/18/2019 1.23  0.67 - 1.58 g/dL Final    Cryoglobulin, Qual 01/18/2019 Absent  Absent Final    RNA Polymerase III Antibodies, IgG* 01/18/2019 <10.0  <20.0 (Negative) U Final    Pathologist Interpretation SPE 01/18/2019 REVIEWED   Final    Pathologist Interpretation ELLEN 01/18/2019 REVIEWED   Final       Past Medical History:   Diagnosis Date    Arthritis     Hypertension      History reviewed. No pertinent surgical history.  Family History   Problem Relation Age of Onset    Hypertension Mother     Diabetes Mother     Arthritis Mother     Kidney cancer Father     Hypertension Father     Heart disease Father        Marital Status:   Alcohol History:  reports that she does not drink alcohol.  Tobacco History:  reports that she has never smoked. She has never used smokeless tobacco.  Drug History:  reports that she does not use drugs.    Review of patient's allergies indicates:   Allergen Reactions    Sulfa (sulfonamide antibiotics)         Current Outpatient Medications:     ALPRAZolam (XANAX) 1 MG tablet, Take 1 tablet (1 mg total) by mouth 2 (two) times daily as needed for Anxiety., Disp: 180 tablet, Rfl: 1    amLODIPine (NORVASC) 5 MG tablet, Take 1 tablet (5 mg total) by mouth 2 (two) times daily., Disp: 180 tablet, Rfl: 1    ascorbate calcium-bioflavonoid (JN-C WITH BIOFLAVONOIDS) 1,000-200 mg Tab, Take 1 tablet by mouth., Disp: , Rfl:     [START ON 3/13/2020] dextroamphetamine-amphetamine (ADDERALL) 30 mg Tab, Take 1 tablet (30 mg total) by mouth once daily., Disp: 30 tablet, Rfl: 0    [START ON 2/13/2020] dextroamphetamine-amphetamine (ADDERALL) 30 mg Tab, Take 1 tablet (30 mg total) by mouth once daily., Disp: 30 tablet, Rfl: 0    dextroamphetamine-amphetamine (ADDERALL) 30 mg Tab, Take 1 tablet (30 mg total) by mouth once daily., Disp: 30 tablet, Rfl: 0    fluticasone propionate (FLONASE) 50 mcg/actuation nasal spray, 1 spray (50 mcg total) by Each Nostril route once daily., Disp: 1 Bottle, Rfl: 5    gabapentin (NEURONTIN) 300 MG capsule, Take 1 capsule (300 mg total) by mouth 3 (three) times daily., Disp: 270 capsule, Rfl: 1    lisinopril (PRINIVIL,ZESTRIL) 20 MG tablet, Take 1 tablet (20 mg total) by mouth once daily., Disp: 90 tablet, Rfl: 1    methocarbamol (ROBAXIN) 500 MG Tab, Take 1 tablet (500 mg total) by mouth 3 (three) times daily., Disp: 270 tablet, Rfl: 1    multivitamin capsule, Take 1 capsule by mouth., Disp: , Rfl:     norgestimate-ethinyl estradiol (SPRINTEC, 28,) 0.25-35 mg-mcg per tablet, Take 1 tablet by mouth., Disp: , Rfl:     omeprazole (PRILOSEC OTC) 20 MG tablet, Take 1 tablet (20 mg total) by mouth every morning., Disp: 90 tablet, Rfl: 1    zolpidem (AMBIEN) 10 mg Tab, 1 tablet at bedtime, Disp: 90 tablet, Rfl: 1    amoxicillin-clavulanate 875-125mg (AUGMENTIN) 875-125 mg per tablet, Take 1 tablet by mouth every 12 (twelve) hours., Disp: 20 tablet, Rfl: 0    HYDROcodone-acetaminophen (NORCO)  "5-325 mg per tablet, Take 1 tablet by mouth every 6 (six) hours as needed for Pain., Disp: 60 tablet, Rfl: 0    levothyroxine (SYNTHROID) 25 MCG tablet, Take 25 mcg by mouth once daily., Disp: , Rfl:     methylPREDNISolone (MEDROL DOSEPACK) 4 mg tablet, use as directed, Disp: 1 Package, Rfl: 0    traMADol (ULTRAM) 50 mg tablet, Take 1 tablet (50 mg total) by mouth every 8 (eight) hours as needed for Pain., Disp: 60 tablet, Rfl: 0    Review of Systems   Constitutional: Negative for chills, fever and unexpected weight change.   HENT: Negative for ear pain, rhinorrhea and sore throat.    Eyes: Negative for pain and visual disturbance.   Respiratory: Negative for cough and shortness of breath.    Cardiovascular: Negative for chest pain, palpitations and leg swelling.   Gastrointestinal: Negative for abdominal pain, diarrhea, nausea and vomiting.   Genitourinary: Negative for difficulty urinating, hematuria and vaginal bleeding.   Musculoskeletal: Negative for arthralgias.   Skin: Negative for rash.   Neurological: Negative for dizziness, weakness and headaches.   Psychiatric/Behavioral: Negative for agitation and sleep disturbance. The patient is not nervous/anxious.           Objective:      Vitals:    01/13/20 0806   BP: 114/74   Pulse: 84   Weight: 50.3 kg (111 lb)   Height: 5' 3" (1.6 m)     Body mass index is 19.66 kg/m².  Physical Exam   Constitutional: She is oriented to person, place, and time. She appears well-developed and well-nourished.   HENT:   Head: Normocephalic.   Right Ear: External ear normal.   Left Ear: External ear normal.   Mouth/Throat: Oropharynx is clear and moist.   Eyes: Pupils are equal, round, and reactive to light. Conjunctivae are normal.   Neck: Normal range of motion. Neck supple. No JVD present.   Cardiovascular: Normal rate and regular rhythm.   No murmur heard.  Pulmonary/Chest: Effort normal and breath sounds normal.   Abdominal: Soft. Bowel sounds are normal.   Musculoskeletal: " Normal range of motion. She exhibits no edema or deformity.   Lymphadenopathy:     She has no cervical adenopathy.   Neurological: She is alert and oriented to person, place, and time. Gait normal.   Skin: Skin is warm, dry and intact. No rash noted.   Psychiatric: She has a normal mood and affect. Her speech is normal and behavior is normal.         Assessment:       1. Neuropathy    2. ADHD, predominantly inattentive type    3. Hypertension, unspecified type    4. Gastroesophageal reflux disease, esophagitis presence not specified    5. Allergic rhinitis, unspecified seasonality, unspecified trigger    6. Polyarthralgia    7. Anxiety    8. Insomnia, unspecified type    9. Hypothyroidism, unspecified type         Plan:       Neuropathy  -     gabapentin (NEURONTIN) 300 MG capsule; Take 1 capsule (300 mg total) by mouth 3 (three) times daily.  Dispense: 270 capsule; Refill: 1    ADHD, predominantly inattentive type  -     dextroamphetamine-amphetamine (ADDERALL) 30 mg Tab; Take 1 tablet (30 mg total) by mouth once daily.  Dispense: 30 tablet; Refill: 0  -     dextroamphetamine-amphetamine (ADDERALL) 30 mg Tab; Take 1 tablet (30 mg total) by mouth once daily.  Dispense: 30 tablet; Refill: 0  -     dextroamphetamine-amphetamine (ADDERALL) 30 mg Tab; Take 1 tablet (30 mg total) by mouth once daily.  Dispense: 30 tablet; Refill: 0    Hypertension, unspecified type  -     amLODIPine (NORVASC) 5 MG tablet; Take 1 tablet (5 mg total) by mouth 2 (two) times daily.  Dispense: 180 tablet; Refill: 1  -     lisinopril (PRINIVIL,ZESTRIL) 20 MG tablet; Take 1 tablet (20 mg total) by mouth once daily.  Dispense: 90 tablet; Refill: 1    Gastroesophageal reflux disease, esophagitis presence not specified  -     omeprazole (PRILOSEC OTC) 20 MG tablet; Take 1 tablet (20 mg total) by mouth every morning.  Dispense: 90 tablet; Refill: 1    Allergic rhinitis, unspecified seasonality, unspecified trigger  -     fluticasone propionate  (FLONASE) 50 mcg/actuation nasal spray; 1 spray (50 mcg total) by Each Nostril route once daily.  Dispense: 1 Bottle; Refill: 5    Polyarthralgia    Anxiety  -     ALPRAZolam (XANAX) 1 MG tablet; Take 1 tablet (1 mg total) by mouth 2 (two) times daily as needed for Anxiety.  Dispense: 180 tablet; Refill: 1    Insomnia, unspecified type  -     zolpidem (AMBIEN) 10 mg Tab; 1 tablet at bedtime  Dispense: 90 tablet; Refill: 1    Hypothyroidism, unspecified type  -     TSH w/reflex to FT4; Future; Expected date: 01/13/2020    Other orders  -     methocarbamol (ROBAXIN) 500 MG Tab; Take 1 tablet (500 mg total) by mouth 3 (three) times daily.  Dispense: 270 tablet; Refill: 1  -     amoxicillin-clavulanate 875-125mg (AUGMENTIN) 875-125 mg per tablet; Take 1 tablet by mouth every 12 (twelve) hours.  Dispense: 20 tablet; Refill: 0  -     methylPREDNISolone (MEDROL DOSEPACK) 4 mg tablet; use as directed  Dispense: 1 Package; Refill: 0      Follow up in about 3 months (around 4/13/2020) for Follow up, medication management.

## 2020-01-14 DIAGNOSIS — M25.50 POLYARTHRALGIA: Primary | ICD-10-CM

## 2020-01-14 RX ORDER — HYDROCODONE BITARTRATE AND ACETAMINOPHEN 5; 325 MG/1; MG/1
1 TABLET ORAL EVERY 6 HOURS PRN
COMMUNITY
End: 2020-01-14 | Stop reason: SDUPTHER

## 2020-01-14 NOTE — TELEPHONE ENCOUNTER
You worked her up yesterday.No CC or anything put in. Please correct and set up her norco if due.

## 2020-01-14 NOTE — TELEPHONE ENCOUNTER
----- Message from Erin Alvarez sent at 1/14/2020  2:48 PM CST -----  Contact: PATIENT LMOR  The patient said her hydrocodone was not called in to [facility] . She said 2 were being called in. The pharmacy got one of them.She did not say which [facility] it was. PTS # [phone] GH

## 2020-01-15 RX ORDER — HYDROCODONE BITARTRATE AND ACETAMINOPHEN 5; 325 MG/1; MG/1
1 TABLET ORAL EVERY 6 HOURS PRN
Qty: 60 TABLET | Refills: 0 | Status: SHIPPED | OUTPATIENT
Start: 2020-01-15 | End: 2020-02-14

## 2020-01-21 ENCOUNTER — TELEPHONE (OUTPATIENT)
Dept: FAMILY MEDICINE | Facility: CLINIC | Age: 55
End: 2020-01-21

## 2020-01-21 NOTE — TELEPHONE ENCOUNTER
Continue as is for now. Complete antibiotic. Did she set up appointment with rheumatology? Call [facility] and see if they have rheumatology clinic.

## 2020-01-21 NOTE — TELEPHONE ENCOUNTER
----- Message from Jenifer Vance sent at 1/21/2020 10:59 AM CST -----  vm- pt is still having problems with painful lymph node in neck and would like to know what to do. If she needs to give it more time or do something else  [phone]

## 2020-01-21 NOTE — TELEPHONE ENCOUNTER
Saw you a week ago, says she is unsure if swelling ever got better. On day 8/10 of abx. Swelling and pain remain in addition to ear pain. Would like to know if she should return to clinic or if she should just continue and or finish abx. Please advise. Thanks

## 2020-02-17 ENCOUNTER — TELEPHONE (OUTPATIENT)
Dept: FAMILY MEDICINE | Facility: CLINIC | Age: 55
End: 2020-02-17

## 2020-02-17 NOTE — TELEPHONE ENCOUNTER
LMOR that lab is due to recheck thyroid and that this was ordered at last office visit to be drawn soon.

## 2020-02-28 ENCOUNTER — TELEPHONE (OUTPATIENT)
Dept: FAMILY MEDICINE | Facility: CLINIC | Age: 55
End: 2020-02-28

## 2020-02-28 NOTE — TELEPHONE ENCOUNTER
----- Message from Bryon Post sent at 2/28/2020  9:57 AM CST -----  Contact: Nataly Traore  Pt says she has questions about her lab work orders. Please give the patient a call back .  Pt# [phone]  Pt wants to know what does [facility] specifically want tested in regards to her Tyroid. If she doesn't answer her phone, please leave a vm.

## 2020-02-28 NOTE — TELEPHONE ENCOUNTER
Spoke with pt - she just wanted to know the name of the lab that [facility] was ordering because she knows there are more than one thyroid lab. I let her know it was a TSH w reflex to free t4

## 2020-04-09 ENCOUNTER — TELEPHONE (OUTPATIENT)
Dept: FAMILY MEDICINE | Facility: CLINIC | Age: 55
End: 2020-04-09

## 2020-04-09 NOTE — TELEPHONE ENCOUNTER
----- Message from Jenifer Vance sent at 4/9/2020  3:47 PM CDT -----  Pt needs refill on [facility]  [phone]

## 2020-04-22 ENCOUNTER — TELEPHONE (OUTPATIENT)
Dept: FAMILY MEDICINE | Facility: CLINIC | Age: 55
End: 2020-04-22

## 2020-04-22 DIAGNOSIS — F90.0 ADHD, PREDOMINANTLY INATTENTIVE TYPE: ICD-10-CM

## 2020-04-22 RX ORDER — DEXTROAMPHETAMINE SACCHARATE, AMPHETAMINE ASPARTATE, DEXTROAMPHETAMINE SULFATE AND AMPHETAMINE SULFATE 7.5; 7.5; 7.5; 7.5 MG/1; MG/1; MG/1; MG/1
1 TABLET ORAL DAILY
Qty: 30 TABLET | Refills: 0 | Status: SHIPPED | OUTPATIENT
Start: 2020-04-22 | End: 2021-08-25 | Stop reason: SDUPTHER

## 2020-04-22 RX ORDER — DEXTROAMPHETAMINE SACCHARATE, AMPHETAMINE ASPARTATE, DEXTROAMPHETAMINE SULFATE AND AMPHETAMINE SULFATE 7.5; 7.5; 7.5; 7.5 MG/1; MG/1; MG/1; MG/1
1 TABLET ORAL DAILY
Qty: 30 TABLET | Refills: 0 | Status: CANCELLED | OUTPATIENT
Start: 2020-04-22

## 2020-04-22 NOTE — TELEPHONE ENCOUNTER
"----- Message from Annie Bhatti MA sent at 2020 11:29 AM CDT -----  Pt called in to report that she has let an Rx , however she would like [facility] to "re-issue" the Rx.    Adderall 30 mg    [facility] [address]    Pt - [phone]  "

## 2020-04-22 NOTE — TELEPHONE ENCOUNTER
She sent a message through the portal earlier and I told her she was due for an appt. Please advise.

## 2020-09-21 ENCOUNTER — TELEPHONE (OUTPATIENT)
Dept: FAMILY MEDICINE | Facility: CLINIC | Age: 55
End: 2020-09-21

## 2020-09-21 RX ORDER — METHYLPREDNISOLONE 4 MG/1
TABLET ORAL
Qty: 1 PACKAGE | Refills: 0 | Status: SHIPPED | OUTPATIENT
Start: 2020-09-21 | End: 2020-10-12

## 2020-09-21 NOTE — TELEPHONE ENCOUNTER
Spoke with pt, states her brother passed away yesterday. She is having to fly out first thing in the morning. She is not packed and is not able to come in today at all. She has blood pressure medication at home and she just doesn't know if she should be taking additional medications considering the circumsatances  Last night after she received the call her BP was 156/107 heart rate 139.   After she had calmed down last night it was 152/90 heart rate 104.  This morning - 126/94 heart rate 78  She also says she was trying to get in for a virtual visit before this happening because she has a lot going on. She has sinus issues that has been going on. She wasn't sure if we would be able to call in a medrol dose pack especially since she has to fly

## 2020-09-21 NOTE — TELEPHONE ENCOUNTER
Medrol sent to pharm. Monitor bp daily. Will do virtual upon return. Sorry to hear about brother.

## 2021-03-08 ENCOUNTER — TELEPHONE (OUTPATIENT)
Dept: FAMILY MEDICINE | Facility: CLINIC | Age: 56
End: 2021-03-08

## 2021-03-09 ENCOUNTER — OFFICE VISIT (OUTPATIENT)
Dept: FAMILY MEDICINE | Facility: CLINIC | Age: 56
End: 2021-03-09

## 2021-03-09 VITALS
DIASTOLIC BLOOD PRESSURE: 80 MMHG | HEIGHT: 63 IN | WEIGHT: 121 LBS | SYSTOLIC BLOOD PRESSURE: 112 MMHG | HEART RATE: 71 BPM | BODY MASS INDEX: 21.44 KG/M2

## 2021-03-09 DIAGNOSIS — M35.01 SJOGREN'S SYNDROME WITH KERATOCONJUNCTIVITIS SICCA: ICD-10-CM

## 2021-03-09 DIAGNOSIS — G47.00 INSOMNIA, UNSPECIFIED TYPE: ICD-10-CM

## 2021-03-09 DIAGNOSIS — I10 HYPERTENSION, UNSPECIFIED TYPE: ICD-10-CM

## 2021-03-09 DIAGNOSIS — M25.50 POLYARTHRALGIA: ICD-10-CM

## 2021-03-09 DIAGNOSIS — G62.9 NEUROPATHY: ICD-10-CM

## 2021-03-09 DIAGNOSIS — Z51.81 ENCOUNTER FOR THERAPEUTIC DRUG MONITORING: ICD-10-CM

## 2021-03-09 DIAGNOSIS — J30.9 ALLERGIC RHINITIS, UNSPECIFIED SEASONALITY, UNSPECIFIED TRIGGER: ICD-10-CM

## 2021-03-09 DIAGNOSIS — Z79.899 ENCOUNTER FOR LONG-TERM (CURRENT) USE OF OTHER MEDICATIONS: Primary | ICD-10-CM

## 2021-03-09 DIAGNOSIS — F41.9 ANXIETY: ICD-10-CM

## 2021-03-09 DIAGNOSIS — F90.0 ADHD, PREDOMINANTLY INATTENTIVE TYPE: ICD-10-CM

## 2021-03-09 PROCEDURE — 99213 OFFICE O/P EST LOW 20 MIN: CPT | Mod: S$GLB,,, | Performed by: NURSE PRACTITIONER

## 2021-03-09 PROCEDURE — 99213 PR OFFICE/OUTPT VISIT, EST, LEVL III, 20-29 MIN: ICD-10-PCS | Mod: S$GLB,,, | Performed by: NURSE PRACTITIONER

## 2021-03-09 RX ORDER — ZOLPIDEM TARTRATE 10 MG/1
TABLET ORAL
Qty: 90 TABLET | Refills: 1 | Status: SHIPPED | OUTPATIENT
Start: 2021-03-09 | End: 2021-03-24 | Stop reason: SDUPTHER

## 2021-03-09 RX ORDER — GABAPENTIN 300 MG/1
300 CAPSULE ORAL 3 TIMES DAILY
Qty: 270 CAPSULE | Refills: 1 | Status: SHIPPED | OUTPATIENT
Start: 2021-03-09 | End: 2021-08-25 | Stop reason: SDUPTHER

## 2021-03-09 RX ORDER — MELATONIN 5 MG
CAPSULE ORAL
COMMUNITY

## 2021-03-09 RX ORDER — MONTELUKAST SODIUM 10 MG/1
10 TABLET ORAL NIGHTLY
Qty: 30 TABLET | Refills: 0 | Status: SHIPPED | OUTPATIENT
Start: 2021-03-09 | End: 2021-03-10 | Stop reason: SDUPTHER

## 2021-03-09 RX ORDER — OMEPRAZOLE 20 MG/1
TABLET, DELAYED RELEASE ORAL
COMMUNITY
End: 2021-03-10 | Stop reason: SDUPTHER

## 2021-03-09 RX ORDER — TRAMADOL HYDROCHLORIDE 50 MG/1
50 TABLET ORAL EVERY 8 HOURS PRN
COMMUNITY
End: 2021-03-10 | Stop reason: SDUPTHER

## 2021-03-09 RX ORDER — DEXTROAMPHETAMINE SACCHARATE, AMPHETAMINE ASPARTATE, DEXTROAMPHETAMINE SULFATE AND AMPHETAMINE SULFATE 7.5; 7.5; 7.5; 7.5 MG/1; MG/1; MG/1; MG/1
1 TABLET ORAL DAILY
Qty: 30 TABLET | Refills: 0 | Status: SHIPPED | OUTPATIENT
Start: 2021-03-09 | End: 2021-08-25 | Stop reason: SDUPTHER

## 2021-03-09 RX ORDER — METHOCARBAMOL 500 MG/1
500 TABLET, FILM COATED ORAL 3 TIMES DAILY
Qty: 270 TABLET | Refills: 1 | Status: SHIPPED | OUTPATIENT
Start: 2021-03-09 | End: 2021-08-25 | Stop reason: SDUPTHER

## 2021-03-09 RX ORDER — DEXTROAMPHETAMINE SACCHARATE, AMPHETAMINE ASPARTATE, DEXTROAMPHETAMINE SULFATE AND AMPHETAMINE SULFATE 7.5; 7.5; 7.5; 7.5 MG/1; MG/1; MG/1; MG/1
1 TABLET ORAL DAILY
Qty: 30 TABLET | Refills: 0 | Status: CANCELLED | OUTPATIENT
Start: 2021-04-08

## 2021-03-09 RX ORDER — ALPRAZOLAM 1 MG/1
1 TABLET ORAL 2 TIMES DAILY PRN
Qty: 180 TABLET | Refills: 1 | Status: SHIPPED | OUTPATIENT
Start: 2021-03-09 | End: 2021-08-25 | Stop reason: SDUPTHER

## 2021-03-09 RX ORDER — LISINOPRIL 20 MG/1
20 TABLET ORAL DAILY
Qty: 90 TABLET | Refills: 1 | Status: SHIPPED | OUTPATIENT
Start: 2021-03-09 | End: 2021-08-25 | Stop reason: SDUPTHER

## 2021-03-09 RX ORDER — AMLODIPINE BESYLATE 5 MG/1
5 TABLET ORAL 2 TIMES DAILY
Qty: 180 TABLET | Refills: 1 | Status: SHIPPED | OUTPATIENT
Start: 2021-03-09 | End: 2021-08-25 | Stop reason: SDUPTHER

## 2021-03-09 RX ORDER — HYDROCODONE BITARTRATE AND ACETAMINOPHEN 5; 325 MG/1; MG/1
1 TABLET ORAL EVERY 6 HOURS PRN
COMMUNITY
End: 2021-03-10 | Stop reason: SDUPTHER

## 2021-03-09 RX ORDER — MINERAL OIL
180 ENEMA (ML) RECTAL DAILY
COMMUNITY

## 2021-03-09 RX ORDER — FLUTICASONE PROPIONATE 50 MCG
1 SPRAY, SUSPENSION (ML) NASAL DAILY
Qty: 16 G | Refills: 3 | Status: SHIPPED | OUTPATIENT
Start: 2021-03-09

## 2021-03-09 RX ORDER — DEXTROAMPHETAMINE SACCHARATE, AMPHETAMINE ASPARTATE, DEXTROAMPHETAMINE SULFATE AND AMPHETAMINE SULFATE 7.5; 7.5; 7.5; 7.5 MG/1; MG/1; MG/1; MG/1
1 TABLET ORAL DAILY
Qty: 30 TABLET | Refills: 0 | Status: SHIPPED | OUTPATIENT
Start: 2021-05-08 | End: 2021-08-25 | Stop reason: SDUPTHER

## 2021-03-09 RX ORDER — MONTELUKAST SODIUM 10 MG/1
10 TABLET ORAL NIGHTLY
COMMUNITY
End: 2021-08-25 | Stop reason: SDUPTHER

## 2021-03-10 RX ORDER — OMEPRAZOLE 20 MG/1
TABLET, DELAYED RELEASE ORAL
Qty: 180 TABLET | Refills: 1 | COMMUNITY
Start: 2021-03-10 | End: 2021-03-15 | Stop reason: SDUPTHER

## 2021-03-10 RX ORDER — MONTELUKAST SODIUM 10 MG/1
10 TABLET ORAL NIGHTLY
Qty: 90 TABLET | Refills: 1 | Status: SHIPPED | OUTPATIENT
Start: 2021-03-10 | End: 2021-04-09

## 2021-03-10 RX ORDER — HYDROCODONE BITARTRATE AND ACETAMINOPHEN 5; 325 MG/1; MG/1
1 TABLET ORAL EVERY 6 HOURS PRN
Qty: 60 TABLET | Refills: 0 | Status: SHIPPED | OUTPATIENT
Start: 2021-03-10 | End: 2021-03-24 | Stop reason: SDUPTHER

## 2021-03-10 RX ORDER — TRAMADOL HYDROCHLORIDE 50 MG/1
50 TABLET ORAL EVERY 8 HOURS PRN
Qty: 30 TABLET | Refills: 0 | Status: SHIPPED | OUTPATIENT
Start: 2021-03-10 | End: 2021-08-25 | Stop reason: SDUPTHER

## 2021-03-15 RX ORDER — OMEPRAZOLE 20 MG/1
TABLET, DELAYED RELEASE ORAL
Qty: 180 TABLET | Refills: 1 | Status: SHIPPED | OUTPATIENT
Start: 2021-03-15 | End: 2021-08-25

## 2021-03-24 DIAGNOSIS — G47.00 INSOMNIA, UNSPECIFIED TYPE: ICD-10-CM

## 2021-03-24 RX ORDER — ZOLPIDEM TARTRATE 10 MG/1
TABLET ORAL
Qty: 90 TABLET | Refills: 1 | Status: SHIPPED | OUTPATIENT
Start: 2021-03-24 | End: 2021-08-25 | Stop reason: SDUPTHER

## 2021-03-24 RX ORDER — HYDROCODONE BITARTRATE AND ACETAMINOPHEN 5; 325 MG/1; MG/1
1 TABLET ORAL EVERY 6 HOURS PRN
Qty: 60 TABLET | Refills: 0 | Status: SHIPPED | OUTPATIENT
Start: 2021-03-24 | End: 2021-08-25

## 2021-03-24 RX ORDER — OMEPRAZOLE 40 MG/1
40 CAPSULE, DELAYED RELEASE ORAL DAILY
Qty: 90 CAPSULE | Refills: 3 | Status: SHIPPED | OUTPATIENT
Start: 2021-03-24 | End: 2022-03-14 | Stop reason: SDUPTHER

## 2021-05-06 ENCOUNTER — PATIENT MESSAGE (OUTPATIENT)
Dept: RESEARCH | Facility: HOSPITAL | Age: 56
End: 2021-05-06

## 2021-08-23 ENCOUNTER — TELEPHONE (OUTPATIENT)
Dept: FAMILY MEDICINE | Facility: CLINIC | Age: 56
End: 2021-08-23

## 2021-08-25 ENCOUNTER — OFFICE VISIT (OUTPATIENT)
Dept: FAMILY MEDICINE | Facility: CLINIC | Age: 56
End: 2021-08-25

## 2021-08-25 ENCOUNTER — HOSPITAL ENCOUNTER (OUTPATIENT)
Dept: RADIOLOGY | Facility: HOSPITAL | Age: 56
Discharge: HOME | End: 2021-08-25
Attending: NURSE PRACTITIONER

## 2021-08-25 VITALS
BODY MASS INDEX: 20.38 KG/M2 | HEIGHT: 63 IN | DIASTOLIC BLOOD PRESSURE: 68 MMHG | HEART RATE: 80 BPM | SYSTOLIC BLOOD PRESSURE: 126 MMHG | WEIGHT: 115 LBS
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
## 2021-08-25 DIAGNOSIS — M79.671 FOOT PAIN, RIGHT: Primary | ICD-10-CM

## 2021-08-25 DIAGNOSIS — G62.9 NEUROPATHY: ICD-10-CM

## 2021-08-25 DIAGNOSIS — Z00.00 PHYSICAL EXAM: ICD-10-CM

## 2021-08-25 DIAGNOSIS — F90.0 ADHD, PREDOMINANTLY INATTENTIVE TYPE: ICD-10-CM

## 2021-08-25 DIAGNOSIS — I10 HYPERTENSION, UNSPECIFIED TYPE: ICD-10-CM

## 2021-08-25 DIAGNOSIS — F41.9 ANXIETY: ICD-10-CM

## 2021-08-25 DIAGNOSIS — Z79.899 HIGH RISK MEDICATION USE: ICD-10-CM

## 2021-08-25 DIAGNOSIS — G47.00 INSOMNIA, UNSPECIFIED TYPE: ICD-10-CM

## 2021-08-25 DIAGNOSIS — M79.671 FOOT PAIN, RIGHT: ICD-10-CM

## 2021-08-25 PROCEDURE — 99213 OFFICE O/P EST LOW 20 MIN: CPT | Mod: S$GLB,,, | Performed by: NURSE PRACTITIONER

## 2021-08-25 PROCEDURE — 99213 PR OFFICE/OUTPT VISIT, EST, LEVL III, 20-29 MIN: ICD-10-PCS | Mod: S$GLB,,, | Performed by: NURSE PRACTITIONER

## 2021-08-25 PROCEDURE — 73630 X-RAY EXAM OF FOOT: CPT | Mod: TC,PO,RT

## 2021-08-25 RX ORDER — DEXTROAMPHETAMINE SACCHARATE, AMPHETAMINE ASPARTATE, DEXTROAMPHETAMINE SULFATE AND AMPHETAMINE SULFATE 7.5; 7.5; 7.5; 7.5 MG/1; MG/1; MG/1; MG/1
1 TABLET ORAL DAILY
Qty: 30 TABLET | Refills: 0 | Status: SHIPPED | OUTPATIENT
Start: 2021-09-24 | End: 2021-10-24

## 2021-08-25 RX ORDER — METHYLPREDNISOLONE 4 MG/1
TABLET ORAL
Qty: 1 PACKAGE | Refills: 0 | Status: SHIPPED | OUTPATIENT
Start: 2021-08-25 | End: 2021-09-15

## 2021-08-25 RX ORDER — MONTELUKAST SODIUM 10 MG/1
10 TABLET ORAL NIGHTLY
Qty: 90 TABLET | Refills: 1 | Status: SHIPPED | OUTPATIENT
Start: 2021-08-25 | End: 2022-03-14 | Stop reason: SDUPTHER

## 2021-08-25 RX ORDER — AMLODIPINE BESYLATE 5 MG/1
5 TABLET ORAL 2 TIMES DAILY
Qty: 180 TABLET | Refills: 1 | Status: SHIPPED | OUTPATIENT
Start: 2021-08-25 | End: 2022-03-14 | Stop reason: SDUPTHER

## 2021-08-25 RX ORDER — ALPRAZOLAM 1 MG/1
1 TABLET ORAL 2 TIMES DAILY PRN
Qty: 180 TABLET | Refills: 1 | Status: SHIPPED | OUTPATIENT
Start: 2021-08-25 | End: 2022-03-14 | Stop reason: SDUPTHER

## 2021-08-25 RX ORDER — DEXTROAMPHETAMINE SACCHARATE, AMPHETAMINE ASPARTATE, DEXTROAMPHETAMINE SULFATE AND AMPHETAMINE SULFATE 7.5; 7.5; 7.5; 7.5 MG/1; MG/1; MG/1; MG/1
1 TABLET ORAL DAILY
Qty: 30 TABLET | Refills: 0 | Status: SHIPPED | OUTPATIENT
Start: 2021-10-24 | End: 2022-03-14 | Stop reason: SDUPTHER

## 2021-08-25 RX ORDER — HYDROCODONE BITARTRATE AND ACETAMINOPHEN 5; 325 MG/1; MG/1
1 TABLET ORAL EVERY 12 HOURS PRN
Qty: 60 TABLET | Refills: 0 | Status: SHIPPED | OUTPATIENT
Start: 2021-08-25 | End: 2022-03-14

## 2021-08-25 RX ORDER — METHOCARBAMOL 500 MG/1
500 TABLET, FILM COATED ORAL 3 TIMES DAILY
Qty: 270 TABLET | Refills: 1 | Status: SHIPPED | OUTPATIENT
Start: 2021-08-25 | End: 2022-03-14 | Stop reason: SDUPTHER

## 2021-08-25 RX ORDER — LISINOPRIL 20 MG/1
20 TABLET ORAL DAILY
Qty: 90 TABLET | Refills: 1 | Status: SHIPPED | OUTPATIENT
Start: 2021-08-25 | End: 2022-03-14 | Stop reason: SDUPTHER

## 2021-08-25 RX ORDER — DEXTROAMPHETAMINE SACCHARATE, AMPHETAMINE ASPARTATE, DEXTROAMPHETAMINE SULFATE AND AMPHETAMINE SULFATE 7.5; 7.5; 7.5; 7.5 MG/1; MG/1; MG/1; MG/1
1 TABLET ORAL DAILY
Qty: 30 TABLET | Refills: 0 | Status: SHIPPED | OUTPATIENT
Start: 2021-08-25 | End: 2021-09-24

## 2021-08-25 RX ORDER — TRAMADOL HYDROCHLORIDE 50 MG/1
50 TABLET ORAL EVERY 8 HOURS PRN
Qty: 30 TABLET | Refills: 0 | Status: SHIPPED | OUTPATIENT
Start: 2021-08-25 | End: 2022-03-14 | Stop reason: SDUPTHER

## 2021-08-25 RX ORDER — GABAPENTIN 300 MG/1
300 CAPSULE ORAL 3 TIMES DAILY
Qty: 270 CAPSULE | Refills: 1 | Status: SHIPPED | OUTPATIENT
Start: 2021-08-25 | End: 2022-03-14 | Stop reason: SDUPTHER

## 2021-08-25 RX ORDER — ZOLPIDEM TARTRATE 10 MG/1
TABLET ORAL
Qty: 90 TABLET | Refills: 1 | Status: SHIPPED | OUTPATIENT
Start: 2021-08-25 | End: 2022-03-14 | Stop reason: SDUPTHER

## 2021-10-04 ENCOUNTER — TELEPHONE (OUTPATIENT)
Dept: FAMILY MEDICINE | Facility: CLINIC | Age: 56
End: 2021-10-04

## 2021-10-04 RX ORDER — METHYLPREDNISOLONE 4 MG/1
TABLET ORAL
Qty: 1 PACKAGE | Refills: 0 | Status: SHIPPED | OUTPATIENT
Start: 2021-10-04 | End: 2021-10-25

## 2021-10-08 ENCOUNTER — TELEPHONE (OUTPATIENT)
Dept: FAMILY MEDICINE | Facility: CLINIC | Age: 56
End: 2021-10-08

## 2022-01-19 ENCOUNTER — TELEPHONE (OUTPATIENT)
Dept: FAMILY MEDICINE | Facility: CLINIC | Age: 57
End: 2022-01-19

## 2022-01-19 ENCOUNTER — OFFICE VISIT (OUTPATIENT)
Dept: FAMILY MEDICINE | Facility: CLINIC | Age: 57
End: 2022-01-19

## 2022-01-19 VITALS
WEIGHT: 110.38 LBS | HEIGHT: 63 IN | BODY MASS INDEX: 19.56 KG/M2 | DIASTOLIC BLOOD PRESSURE: 76 MMHG | TEMPERATURE: 99 F | SYSTOLIC BLOOD PRESSURE: 122 MMHG | HEART RATE: 87 BPM | OXYGEN SATURATION: 99 %

## 2022-01-19 DIAGNOSIS — M35.01 SJOGREN'S SYNDROME WITH KERATOCONJUNCTIVITIS SICCA: ICD-10-CM

## 2022-01-19 DIAGNOSIS — I10 HYPERTENSION, UNSPECIFIED TYPE: ICD-10-CM

## 2022-01-19 DIAGNOSIS — F41.9 ANXIETY: ICD-10-CM

## 2022-01-19 DIAGNOSIS — F90.0 ADHD, PREDOMINANTLY INATTENTIVE TYPE: ICD-10-CM

## 2022-01-19 DIAGNOSIS — B02.9 HERPES ZOSTER WITHOUT COMPLICATION: Primary | ICD-10-CM

## 2022-01-19 PROCEDURE — 99214 OFFICE O/P EST MOD 30 MIN: CPT | Mod: S$GLB,,, | Performed by: PHYSICIAN ASSISTANT

## 2022-01-19 PROCEDURE — 99214 PR OFFICE/OUTPT VISIT, EST, LEVL IV, 30-39 MIN: ICD-10-PCS | Mod: S$GLB,,, | Performed by: PHYSICIAN ASSISTANT

## 2022-01-19 RX ORDER — VALACYCLOVIR HYDROCHLORIDE 1 G/1
1000 TABLET, FILM COATED ORAL 3 TIMES DAILY
Qty: 21 TABLET | Refills: 0 | Status: SHIPPED | OUTPATIENT
Start: 2022-01-19 | End: 2023-11-02

## 2022-01-19 NOTE — TELEPHONE ENCOUNTER
----- Message from Cara Urrutia sent at 1/19/2022 10:59 AM CST -----  Pt calling said she is having pains in the middle of her chest that radiate to her back near the shoulder blade after the pain come a possible bite or rash, red blisters on her bra line. Pt thinks it looks like shingles lavinia she has never had chicken pox. She also has fibromyalgia and got her 2nd Covid Vaccine yesterday as well but these symptoms started Sunday.   # [phone]

## 2022-01-19 NOTE — PROGRESS NOTES
"  SUBJECTIVE:    Patient ID: Nataly Traore is a 56 y.o. female.    Chief Complaint: Rash (Under left breast to mid back  x 2 days with pain )    Pt is a 56-year-old female who presents today for sick visit with a CC "a rash." This past Sunday, she experienced an acute onset of a rash that is located around the posterior, left shoulder blade and radiates around to her chest, stopping below the left breast, not crossing midline.  The rash is described as "blisters." There is a 9/10, sharp, burning sensation associated with this rash.  She describes this burning sensation as "fiberglass feeling under the skin." Laying on the back, or when pressure is applied to the rash, exacerbate this pain.  In an attempt to alleviate the pain, she has been taking Gabapentin 300mg t.i.d. and Robaxin 500mg 2 tablets b.i.d. These measures provided only temporary relief, and the pain returns.  She denies any fevers or chills.     No visits with results within 6 Month(s) from this visit.   Latest known visit with results is:   Lab Visit on 01/18/2019   Component Date Value Ref Range Status    CRP 01/18/2019 2.1  0.0 - 8.2 mg/L Final    Complement (C-3) 01/18/2019 121  50 - 180 mg/dL Final    Complement (C-4) 01/18/2019 14  11 - 44 mg/dL Final    Immunofix Interp. 01/18/2019 SEE COMMENT   Final    Protein, Serum 01/18/2019 7.0  6.0 - 8.4 g/dL Final    Albumin 01/18/2019 3.96  3.35 - 5.55 g/dL Final    Alpha-1 01/18/2019 0.32  0.17 - 0.41 g/dL Final    Alpha-2 01/18/2019 0.76  0.43 - 0.99 g/dL Final    Beta 01/18/2019 0.74  0.50 - 1.10 g/dL Final    Gamma 01/18/2019 1.23  0.67 - 1.58 g/dL Final    Cryoglobulin, Qual 01/18/2019 Absent  Absent Final    RNA Polymerase III Antibodies, IgG* 01/18/2019 <10.0  <20.0 (Negative) U Final    Pathologist Interpretation SPE 01/18/2019 REVIEWED   Final    Pathologist Interpretation ELLEN 01/18/2019 REVIEWED   Final       Past Medical History:   Diagnosis Date    Arthritis     " Hypertension      History reviewed. No pertinent surgical history.  Family History   Problem Relation Age of Onset    Hypertension Mother     Diabetes Mother     Arthritis Mother     Kidney cancer Father     Hypertension Father     Heart disease Father        Marital Status:   Alcohol History:  reports no history of alcohol use.  Tobacco History:  reports that she has never smoked. She has never used smokeless tobacco.  Drug History:  reports no history of drug use.    Health Maintenance Topics with due status: Not Due       Topic Last Completion Date    Lipid Panel 03/11/2021       There is no immunization history on file for this patient.    Review of patient's allergies indicates:   Allergen Reactions    Sulfa (sulfonamide antibiotics)        Current Outpatient Medications:     ALPRAZolam (XANAX) 1 MG tablet, Take 1 tablet (1 mg total) by mouth 2 (two) times daily as needed for Anxiety., Disp: 180 tablet, Rfl: 1    amLODIPine (NORVASC) 5 MG tablet, Take 1 tablet (5 mg total) by mouth 2 (two) times daily., Disp: 180 tablet, Rfl: 1    ascorbate calcium-bioflavonoid 1,000-200 mg Tab, Take 1 tablet by mouth., Disp: , Rfl:     dextroamphetamine-amphetamine (ADDERALL) 30 mg Tab, Take 1 tablet (30 mg total) by mouth once daily., Disp: 30 tablet, Rfl: 0    fexofenadine (ALLEGRA) 180 MG tablet, Take 180 mg by mouth once daily., Disp: , Rfl:     fluticasone propionate (FLONASE) 50 mcg/actuation nasal spray, 1 spray (50 mcg total) by Each Nostril route once daily., Disp: 16 g, Rfl: 3    gabapentin (NEURONTIN) 300 MG capsule, Take 1 capsule (300 mg total) by mouth 3 (three) times daily., Disp: 270 capsule, Rfl: 1    lisinopriL (PRINIVIL,ZESTRIL) 20 MG tablet, Take 1 tablet (20 mg total) by mouth once daily., Disp: 90 tablet, Rfl: 1    melatonin 5 mg Cap, Take by mouth., Disp: , Rfl:     methocarbamoL (ROBAXIN) 500 MG Tab, Take 1 tablet (500 mg total) by mouth 3 (three) times daily., Disp: 270 tablet,  "Rfl: 1    montelukast (SINGULAIR) 10 mg tablet, Take 1 tablet (10 mg total) by mouth every evening., Disp: 90 tablet, Rfl: 1    multivitamin capsule, Take 1 capsule by mouth., Disp: , Rfl:     omeprazole (PRILOSEC) 40 MG capsule, Take 1 capsule (40 mg total) by mouth once daily., Disp: 90 capsule, Rfl: 3    zolpidem (AMBIEN) 10 mg Tab, 1 tablet at bedtime, Disp: 90 tablet, Rfl: 1    HYDROcodone-acetaminophen (NORCO) 5-325 mg per tablet, Take 1 tablet by mouth every 12 (twelve) hours as needed for Pain. (Patient not taking: Reported on 1/19/2022), Disp: 60 tablet, Rfl: 0    norgestimate-ethinyl estradioL (ORTHO-CYCLEN) 0.25-35 mg-mcg per tablet, Take 1 tablet by mouth., Disp: , Rfl:     traMADoL (ULTRAM) 50 mg tablet, Take 1 tablet (50 mg total) by mouth every 8 (eight) hours as needed for Pain. (Patient not taking: Reported on 1/19/2022), Disp: 30 tablet, Rfl: 0    valACYclovir (VALTREX) 1000 MG tablet, Take 1 tablet (1,000 mg total) by mouth 3 (three) times daily. for 7 days, Disp: 21 tablet, Rfl: 0    Review of Systems   Constitutional: Negative for activity change, chills, fatigue and fever.   Respiratory: Negative for cough, shortness of breath and wheezing.    Cardiovascular: Negative for chest pain, palpitations and leg swelling.   Gastrointestinal: Negative for abdominal pain, constipation, diarrhea, nausea and vomiting.   Musculoskeletal: Positive for back pain (Between the shoulder blades.). Negative for arthralgias and myalgias.   Skin: Positive for rash.   Neurological: Negative for dizziness, syncope, weakness and light-headedness.          Objective:      Vitals:    01/19/22 1538   BP: 122/76   Pulse: 87   Temp: 98.8 °F (37.1 °C)   SpO2: 99%   Weight: 50.1 kg (110 lb 6.4 oz)   Height: 5' 3" (1.6 m)     Physical Exam  Vitals and nursing note reviewed.   Constitutional:       General: She is not in acute distress.     Appearance: Normal appearance. She is normal weight. She is not ill-appearing, " toxic-appearing or diaphoretic.   HENT:      Head: Normocephalic and atraumatic.      Right Ear: External ear normal.      Left Ear: External ear normal.   Eyes:      General: No scleral icterus.        Right eye: No discharge.         Left eye: No discharge.      Extraocular Movements: Extraocular movements intact.      Conjunctiva/sclera: Conjunctivae normal.   Cardiovascular:      Rate and Rhythm: Normal rate and regular rhythm.      Pulses: Normal pulses.      Heart sounds: Normal heart sounds. No murmur heard.  No friction rub. No gallop.    Pulmonary:      Effort: Pulmonary effort is normal. No respiratory distress.      Breath sounds: Normal breath sounds. No wheezing, rhonchi or rales.   Abdominal:      General: There is no distension.      Palpations: Abdomen is soft.      Tenderness: There is no abdominal tenderness. There is no guarding or rebound.   Musculoskeletal:      Cervical back: Normal range of motion and neck supple.      Right lower leg: No edema.      Left lower leg: No edema.   Skin:     General: Skin is warm and dry.      Findings: Erythema and rash present. No abscess or bruising. Rash is vesicular.             Comments: Red X's barrera the areas of multiple vesicular lesions on an erythematous base.  The rash does not cross the anterior or posterior midline and trends in a dermatomal pattern.   Neurological:      General: No focal deficit present.      Mental Status: She is alert. Mental status is at baseline.      Gait: Gait normal.   Psychiatric:         Mood and Affect: Mood normal.         Behavior: Behavior normal. Behavior is cooperative.           Assessment:       1. Herpes zoster without complication    2. Anxiety    3. Hypertension, unspecified type    4. ADHD, predominantly inattentive type    5. Sjogren's syndrome with keratoconjunctivitis sicca           Plan:       Herpes zoster without complication  Comments:  Start Valtrex 1,000mg t.i.d. x7 days.  Continue Gabapentin 300mg  t.i.d. x 7 for pain control.   Pt declined needing refills of Gabapentin at this time.   Orders:  -     valACYclovir (VALTREX) 1000 MG tablet; Take 1 tablet (1,000 mg total) by mouth 3 (three) times daily. for 7 days  Dispense: 21 tablet; Refill: 0    Anxiety    Hypertension, unspecified type  Comments:  Currently stable and well controlled.  Continue as is.    ADHD, predominantly inattentive type    Sjogren's syndrome with keratoconjunctivitis sicca      Follow up if symptoms worsen or fail to improve, for Shingles.        1/19/2022 Jimmy Rascon PA-C

## 2022-01-19 NOTE — PATIENT INSTRUCTIONS
Patient Education       Shingles Discharge Instructions   About this topic   Shingles or herpes zoster is a painful skin rash. It is caused by a germ called varicella zoster virus.  Shingles infects a nerve and the skin that the nerve supplies. It most often affects only one side of the body. The face, around the eyes, and the forehead are often involved.  The pain, itching, and rash can make people feel uncomfortable. First, you feel pain and then a rash appears as fluid-filled blisters. The fluid in the blisters can infect other people who have never had chicken pox or a vaccine. Those people will develop chicken pox, not shingles. It is important to keep the blisters covered until they dry, heal, and scabs form. Once the blisters dry, you cannot pass the infection to anyone. It may take 3 to 4 weeks for the scabs to fully heal.     What care is needed at home?   · Ask your doctor what you need to do when you go home. Make sure you ask questions if you do not understand what the doctor says.  · Take your drugs and apply creams and ointments as ordered by your doctor.  · Keep your blisters covered. Do not touch your dressing if you have not washed your hands. Change the dressing every day or if it gets soaked. Wash hands right away after you change your dressing.  · Do not touch or scratch the rash.  · Ask your doctor when it is safe to take a bath or shower.  · You may use mild soap and water to wash your blisters. Pat your skin dry with a clean, dry towel. Do not rub. Make sure not to scratch your blisters when drying yourself.  · Avoid cleaning your ears if your blisters are near the ear. You may scratch a blister inside your ear and worsen your condition.  What follow-up care is needed?   · Your doctor may ask you to make visits to the office to check on your progress. Be sure to keep these visits.  · You doctor may send you to a special doctor for nerves, eyes, or ears. Who you see depends on the part of your  body the illness affects.  What drugs may be needed?   The doctor may order drugs to:  · Help with pain  · Relieve itchiness  · Fight the virus  · Keep your eyes moist  · Numb the affected area  Will physical activity be limited?   Your activity may be limited for a few days. You may not feel like going out if the rash can be seen by other people.  What problems could happen?   · Very bad pain that lasts after the rash goes away  · Some other infection  · Eye problems  · Hearing problems  When do I need to call the doctor?   · Very bad pain that is not helped by the drugs you are taking  · Eyesight changes  · Hearing problems  · Another infection in the lungs or brain  Helpful tips   · Wear loose-fitting clothes.  · Use nonstick bandages.  · Wear sunglasses when you go out to cover the affected eye and to keep it moist.  · Stay away from pregnant women, infants, cancer patients, and people with low immune defenses.  · If you see a rash or think that a painful part on your body may be caused by shingles, see you doctor right away. Early treatment may shorten the length and severity of the illness.  Teach Back: Helping You Understand   The Teach Back Method helps you understand the information we are giving you. After you talk with the staff, tell them in your own words what you learned. This helps to make sure the staff has described each thing clearly. It also helps to explain things that may have been confusing. Before going home, make sure you can do these:  · I can tell you about my condition.  · I can tell you how I will take care of my blisters.  · I can tell you what I will do if I have very bad pain or another infection.  Where can I learn more?   American Academy [employer]at[employer]  https://www.aad.org/public/diseases/a-z/shingles-overview   Center for Disease Control and Prevention  http://www.cdc.gov/shingles/about/overview.html    FamilyDoctor.org  http://familydoctor.org/familydoctor/en/diseases-conditions/shingles/treatment.html   Last Reviewed Date   2020-10-13  Consumer Information Use and Disclaimer   This information is not specific medical advice and does not replace information you receive from your health care provider. This is only a brief summary of general information. It does NOT include all information about conditions, illnesses, injuries, tests, procedures, treatments, therapies, discharge instructions or life-style choices that may apply to you. You must talk with your health care provider for complete information about your health and treatment options. This information should not be used to decide whether or not to accept your health care providers advice, instructions or recommendations. Only your health care provider has the knowledge and training to provide advice that is right for you.  Copyright   Copyright © 2021 [employer] and its affiliates and/or licensors. All rights reserved.

## 2022-01-20 ENCOUNTER — TELEPHONE (OUTPATIENT)
Dept: FAMILY MEDICINE | Facility: CLINIC | Age: 57
End: 2022-01-20

## 2022-01-20 NOTE — TELEPHONE ENCOUNTER
Pt denies hitting her head. She states her BP has been fine when she checks it. Pt did not give readings. Pt states this has happened in the past on more than 2 occasions. Per Jimmy Valtrex is not known for dropping BP. Decrease gabapentin to once daily. Pt voiced understanding.   Pt wants to know what she soul do about her twisted ankle. Pt states she has full ROM and able to bare weight. Per Jimmy RICE therapy, if pain worsens call to be evaluated. . Pt voiced understanding.    18 0

## 2022-01-24 ENCOUNTER — TELEPHONE (OUTPATIENT)
Dept: FAMILY MEDICINE | Facility: CLINIC | Age: 57
End: 2022-01-24

## 2022-01-24 DIAGNOSIS — M79.7 FIBROMYALGIA: Primary | ICD-10-CM

## 2022-01-24 RX ORDER — METHYLPREDNISOLONE 4 MG/1
TABLET ORAL
Qty: 21 EACH | Refills: 0 | Status: SHIPPED | OUTPATIENT
Start: 2022-01-24 | End: 2022-02-10 | Stop reason: SDUPTHER

## 2022-01-24 NOTE — TELEPHONE ENCOUNTER
I believe this pain is secondary to a fibromyalgias flare-up. Start a Medrol Dosepak. This Rx was sent to [facility] [address]

## 2022-01-24 NOTE — TELEPHONE ENCOUNTER
----- Message from Cara Renan sent at 1/24/2022 12:03 PM CST -----  Pt calling said she saw Jimmy last wed dx with shingles said she is having a lot of pain in her neck and head last night and today.  She is not sure if this is related to that dx or would she be a candidate for a medrol dose lona that she has also had a recent fall due to her fibromyalgia.  # [phone]

## 2022-02-09 ENCOUNTER — TELEPHONE (OUTPATIENT)
Dept: FAMILY MEDICINE | Facility: CLINIC | Age: 57
End: 2022-02-09

## 2022-02-09 NOTE — TELEPHONE ENCOUNTER
Please contact patient and inform her that these symptoms would have to be re-evaluated in an office visit.  She was treated for shingles 3 weeks ago and I do not believe this is stemming from the same pathology.

## 2022-02-09 NOTE — TELEPHONE ENCOUNTER
----- Message from Cara Urrutia sent at 2/9/2022  9:48 AM CST -----  Pt called said she is being treated for Shingles that she is in pain to the point she cannot sleep has been going on for 3 weeks and is asking for a refill on the steroid lona and maybe something to help with the pain even if it is a topical, [facility] [address].  She has been using some OTC topicals but they are not helping.    [phone]

## 2022-02-10 ENCOUNTER — TELEPHONE (OUTPATIENT)
Dept: FAMILY MEDICINE | Facility: CLINIC | Age: 57
End: 2022-02-10

## 2022-02-10 DIAGNOSIS — M79.7 FIBROMYALGIA: ICD-10-CM

## 2022-02-10 RX ORDER — METHYLPREDNISOLONE 4 MG/1
TABLET ORAL
Qty: 21 EACH | Refills: 0 | Status: SHIPPED | OUTPATIENT
Start: 2022-02-10 | End: 2022-03-03

## 2022-02-10 NOTE — TELEPHONE ENCOUNTER
----- Message from Cara Urrutia sent at 2/10/2022 11:52 AM CST -----  Pt calling to  on getting another refill on steroid lona for shingles. Per Jimmy the pt would need to be seen back in office to be re-evaluated.  The pt is calling back now said she is a cash pay and is trying to avoid another cash payment.  She also has questions about an additional $ 60 bill after her last visit. She does want a nurse call back so she is aware of what is going on with her request.  # [phone]

## 2022-03-07 ENCOUNTER — TELEPHONE (OUTPATIENT)
Dept: FAMILY MEDICINE | Facility: CLINIC | Age: 57
End: 2022-03-07

## 2022-03-07 NOTE — TELEPHONE ENCOUNTER
----- Message from Jenifer Vance sent at 3/7/2022 10:02 AM CST -----  Pt needs an appt by next week. She will be out of medication by then  [phone]

## 2022-03-14 ENCOUNTER — OFFICE VISIT (OUTPATIENT)
Dept: FAMILY MEDICINE | Facility: CLINIC | Age: 57
End: 2022-03-14

## 2022-03-14 VITALS
DIASTOLIC BLOOD PRESSURE: 60 MMHG | HEART RATE: 88 BPM | SYSTOLIC BLOOD PRESSURE: 110 MMHG | BODY MASS INDEX: 19.31 KG/M2 | WEIGHT: 109 LBS | HEIGHT: 63 IN

## 2022-03-14 DIAGNOSIS — M79.7 FIBROMYALGIA: ICD-10-CM

## 2022-03-14 DIAGNOSIS — F90.0 ADHD, PREDOMINANTLY INATTENTIVE TYPE: ICD-10-CM

## 2022-03-14 DIAGNOSIS — F41.9 ANXIETY: ICD-10-CM

## 2022-03-14 DIAGNOSIS — B02.9 HERPES ZOSTER WITHOUT COMPLICATION: ICD-10-CM

## 2022-03-14 DIAGNOSIS — G47.00 INSOMNIA, UNSPECIFIED TYPE: ICD-10-CM

## 2022-03-14 DIAGNOSIS — G62.9 NEUROPATHY: ICD-10-CM

## 2022-03-14 DIAGNOSIS — I10 HYPERTENSION, UNSPECIFIED TYPE: ICD-10-CM

## 2022-03-14 DIAGNOSIS — M79.671 FOOT PAIN, RIGHT: ICD-10-CM

## 2022-03-14 DIAGNOSIS — M35.01 SJOGREN'S SYNDROME WITH KERATOCONJUNCTIVITIS SICCA: Primary | ICD-10-CM

## 2022-03-14 DIAGNOSIS — Z79.899 HIGH RISK MEDICATION USE: ICD-10-CM

## 2022-03-14 PROCEDURE — 99213 PR OFFICE/OUTPT VISIT, EST, LEVL III, 20-29 MIN: ICD-10-PCS | Mod: S$GLB,,, | Performed by: NURSE PRACTITIONER

## 2022-03-14 PROCEDURE — 99213 OFFICE O/P EST LOW 20 MIN: CPT | Mod: S$GLB,,, | Performed by: NURSE PRACTITIONER

## 2022-03-14 RX ORDER — AMLODIPINE BESYLATE 5 MG/1
5 TABLET ORAL 2 TIMES DAILY
Qty: 180 TABLET | Refills: 1 | Status: SHIPPED | OUTPATIENT
Start: 2022-03-14 | End: 2022-09-14 | Stop reason: SDUPTHER

## 2022-03-14 RX ORDER — METHOCARBAMOL 500 MG/1
500 TABLET, FILM COATED ORAL 3 TIMES DAILY
Qty: 270 TABLET | Refills: 1 | Status: SHIPPED | OUTPATIENT
Start: 2022-03-14 | End: 2022-09-14 | Stop reason: SDUPTHER

## 2022-03-14 RX ORDER — TRAMADOL HYDROCHLORIDE 50 MG/1
50 TABLET ORAL EVERY 8 HOURS PRN
Qty: 30 TABLET | Refills: 0 | Status: SHIPPED | OUTPATIENT
Start: 2022-03-14 | End: 2023-03-13 | Stop reason: SDUPTHER

## 2022-03-14 RX ORDER — MONTELUKAST SODIUM 10 MG/1
10 TABLET ORAL NIGHTLY
Qty: 90 TABLET | Refills: 1 | Status: SHIPPED | OUTPATIENT
Start: 2022-03-14 | End: 2023-03-13 | Stop reason: SDUPTHER

## 2022-03-14 RX ORDER — ALPRAZOLAM 1 MG/1
1 TABLET ORAL 2 TIMES DAILY PRN
Qty: 180 TABLET | Refills: 1 | Status: SHIPPED | OUTPATIENT
Start: 2022-03-14 | End: 2022-09-14 | Stop reason: SDUPTHER

## 2022-03-14 RX ORDER — HYDROCODONE BITARTRATE AND ACETAMINOPHEN 5; 325 MG/1; MG/1
1 TABLET ORAL EVERY 12 HOURS PRN
Qty: 60 TABLET | Refills: 0 | Status: SHIPPED | OUTPATIENT
Start: 2022-03-14 | End: 2022-09-14 | Stop reason: SDUPTHER

## 2022-03-14 RX ORDER — ZOLPIDEM TARTRATE 10 MG/1
TABLET ORAL
Qty: 90 TABLET | Refills: 1 | Status: SHIPPED | OUTPATIENT
Start: 2022-03-14 | End: 2022-09-14 | Stop reason: SDUPTHER

## 2022-03-14 RX ORDER — OMEPRAZOLE 40 MG/1
40 CAPSULE, DELAYED RELEASE ORAL DAILY
Qty: 90 CAPSULE | Refills: 3 | Status: SHIPPED | OUTPATIENT
Start: 2022-03-14 | End: 2023-03-13 | Stop reason: SDUPTHER

## 2022-03-14 RX ORDER — LISINOPRIL 20 MG/1
20 TABLET ORAL DAILY
Qty: 90 TABLET | Refills: 1 | Status: SHIPPED | OUTPATIENT
Start: 2022-03-14 | End: 2022-09-14 | Stop reason: SDUPTHER

## 2022-03-14 RX ORDER — GABAPENTIN 300 MG/1
300 CAPSULE ORAL 3 TIMES DAILY
Qty: 270 CAPSULE | Refills: 1 | Status: SHIPPED | OUTPATIENT
Start: 2022-03-14 | End: 2022-09-14 | Stop reason: SDUPTHER

## 2022-03-14 RX ORDER — DEXTROAMPHETAMINE SACCHARATE, AMPHETAMINE ASPARTATE, DEXTROAMPHETAMINE SULFATE AND AMPHETAMINE SULFATE 7.5; 7.5; 7.5; 7.5 MG/1; MG/1; MG/1; MG/1
1 TABLET ORAL DAILY
Qty: 30 TABLET | Refills: 0 | Status: SHIPPED | OUTPATIENT
Start: 2022-03-14 | End: 2022-07-28 | Stop reason: SDUPTHER

## 2022-03-14 NOTE — PROGRESS NOTES
SUBJECTIVE:    Patient ID: Nataly Traore is a 56 y.o. female.    Chief Complaint: Medication Refill (Brought bottles, Mammo pt will sched// SW)    56 year old female presents for check up. Treated for sjogrens, htn, insomnia, allergic rhinitis, adhd and chronic pain. Needs refills on medications.due for labs. Plans to have done a lallie uriostegui. . Recently diagnosed with shingles. Took medication as prescribed. Reports that rash and pain finally seems to be improving. Has not been back to work. No sleeping well.  . Not exercising. Does not check bp at home.   Was at [email or web address] on Friday. Reports that slipped on Friday. Landed on backside. Has pain all over. Has left ankle pain. Seen at urgent care. xrays were negative. Still has pain      No visits with results within 6 Month(s) from this visit.   Latest known visit with results is:   Lab Visit on 01/18/2019   Component Date Value Ref Range Status    CRP 01/18/2019 2.1  0.0 - 8.2 mg/L Final    Complement (C-3) 01/18/2019 121  50 - 180 mg/dL Final    Complement (C-4) 01/18/2019 14  11 - 44 mg/dL Final    Immunofix Interp. 01/18/2019 SEE COMMENT   Final    Protein, Serum 01/18/2019 7.0  6.0 - 8.4 g/dL Final    Albumin 01/18/2019 3.96  3.35 - 5.55 g/dL Final    Alpha-1 01/18/2019 0.32  0.17 - 0.41 g/dL Final    Alpha-2 01/18/2019 0.76  0.43 - 0.99 g/dL Final    Beta 01/18/2019 0.74  0.50 - 1.10 g/dL Final    Gamma 01/18/2019 1.23  0.67 - 1.58 g/dL Final    Cryoglobulin, Qual 01/18/2019 Absent  Absent Final    RNA Polymerase III Antibodies, IgG* 01/18/2019 <10.0  <20.0 (Negative) U Final    Pathologist Interpretation SPE 01/18/2019 REVIEWED   Final    Pathologist Interpretation ELELN 01/18/2019 REVIEWED   Final       Past Medical History:   Diagnosis Date    Arthritis     Hypertension      Social History     Socioeconomic History    Marital status:    Tobacco Use    Smoking status: Never Smoker    Smokeless tobacco: Never Used   Substance and  Sexual Activity    Alcohol use: No    Drug use: No    Sexual activity: Yes     Partners: Male     History reviewed. No pertinent surgical history.  Family History   Problem Relation Age of Onset    Hypertension Mother     Diabetes Mother     Arthritis Mother     Kidney cancer Father     Hypertension Father     Heart disease Father        Review of patient's allergies indicates:   Allergen Reactions    Sulfa (sulfonamide antibiotics)        Current Outpatient Medications:     ascorbate calcium-bioflavonoid 1,000-200 mg Tab, Take 1 tablet by mouth., Disp: , Rfl:     fexofenadine (ALLEGRA) 180 MG tablet, Take 180 mg by mouth once daily., Disp: , Rfl:     fluticasone propionate (FLONASE) 50 mcg/actuation nasal spray, 1 spray (50 mcg total) by Each Nostril route once daily., Disp: 16 g, Rfl: 3    melatonin 5 mg Cap, Take by mouth., Disp: , Rfl:     multivitamin capsule, Take 1 capsule by mouth., Disp: , Rfl:     norgestimate-ethinyl estradioL (ORTHO-CYCLEN) 0.25-35 mg-mcg per tablet, Take 1 tablet by mouth., Disp: , Rfl:     ALPRAZolam (XANAX) 1 MG tablet, Take 1 tablet (1 mg total) by mouth 2 (two) times daily as needed for Anxiety., Disp: 180 tablet, Rfl: 1    amLODIPine (NORVASC) 5 MG tablet, Take 1 tablet (5 mg total) by mouth 2 (two) times daily., Disp: 180 tablet, Rfl: 1    dextroamphetamine-amphetamine (ADDERALL) 30 mg Tab, Take 1 tablet (30 mg total) by mouth once daily., Disp: 30 tablet, Rfl: 0    gabapentin (NEURONTIN) 300 MG capsule, Take 1 capsule (300 mg total) by mouth 3 (three) times daily., Disp: 270 capsule, Rfl: 1    HYDROcodone-acetaminophen (NORCO) 5-325 mg per tablet, Take 1 tablet by mouth every 12 (twelve) hours as needed for Pain., Disp: 60 tablet, Rfl: 0    LIDOcaine 3.5 % Lotn, Apply 1 application topically 2 (two) times a day., Disp: 120 g, Rfl: 0    lisinopriL (PRINIVIL,ZESTRIL) 20 MG tablet, Take 1 tablet (20 mg total) by mouth once daily., Disp: 90 tablet, Rfl: 1     "methocarbamoL (ROBAXIN) 500 MG Tab, Take 1 tablet (500 mg total) by mouth 3 (three) times daily., Disp: 270 tablet, Rfl: 1    montelukast (SINGULAIR) 10 mg tablet, Take 1 tablet (10 mg total) by mouth every evening., Disp: 90 tablet, Rfl: 1    omeprazole (PRILOSEC) 40 MG capsule, Take 1 capsule (40 mg total) by mouth once daily., Disp: 90 capsule, Rfl: 3    traMADoL (ULTRAM) 50 mg tablet, Take 1 tablet (50 mg total) by mouth every 8 (eight) hours as needed for Pain., Disp: 30 tablet, Rfl: 0    valACYclovir (VALTREX) 1000 MG tablet, Take 1 tablet (1,000 mg total) by mouth 3 (three) times daily. for 7 days, Disp: 21 tablet, Rfl: 0    zolpidem (AMBIEN) 10 mg Tab, 1 tablet at bedtime, Disp: 90 tablet, Rfl: 1    Review of Systems   Constitutional: Negative for chills, fever and unexpected weight change.   HENT: Negative for ear pain, rhinorrhea and sore throat.    Eyes: Negative for pain and visual disturbance.   Respiratory: Negative for cough and shortness of breath.    Cardiovascular: Negative for chest pain, palpitations and leg swelling.   Gastrointestinal: Negative for abdominal pain, diarrhea, nausea and vomiting.   Genitourinary: Negative for difficulty urinating, hematuria and vaginal bleeding.   Musculoskeletal: Negative for arthralgias.        Ankle pain   Skin: Negative for rash.   Neurological: Negative for dizziness, weakness and headaches.   Psychiatric/Behavioral: Negative for agitation and sleep disturbance. The patient is not nervous/anxious.           Objective:      Vitals:    03/14/22 1058   BP: 110/60   Pulse: 88   Weight: 49.4 kg (109 lb)   Height: 5' 3" (1.6 m)     Physical Exam  Vitals and nursing note reviewed.   Constitutional:       Appearance: She is well-developed.   HENT:      Right Ear: External ear normal.      Left Ear: External ear normal.   Eyes:      Conjunctiva/sclera: Conjunctivae normal.      Pupils: Pupils are equal, round, and reactive to light.   Neck:      Vascular: No " JVD.   Cardiovascular:      Rate and Rhythm: Normal rate and regular rhythm.      Heart sounds: No murmur heard.  Pulmonary:      Effort: Pulmonary effort is normal.      Breath sounds: Normal breath sounds.   Abdominal:      General: Bowel sounds are normal.      Palpations: Abdomen is soft.   Musculoskeletal:         General: No deformity. Normal range of motion.      Cervical back: Normal range of motion and neck supple.      Left ankle: Swelling present. No deformity. Tenderness present. Normal range of motion.        Legs:       Comments: Mild palpation tenderness   Lymphadenopathy:      Cervical: No cervical adenopathy.   Skin:     General: Skin is warm and dry.      Findings: No rash.      Comments: Lesions are dry and faded   Neurological:      Mental Status: She is alert and oriented to person, place, and time.      Gait: Gait normal.   Psychiatric:         Speech: Speech normal.         Behavior: Behavior normal.           Assessment:       1. Sjogren's syndrome with keratoconjunctivitis sicca    2. ADHD, predominantly inattentive type    3. Neuropathy    4. Hypertension, unspecified type    5. Anxiety    6. Insomnia, unspecified type    7. High risk medication use    8. Foot pain, right    9. Fibromyalgia    10. Herpes zoster without complication         Plan:       Sjogren's syndrome with keratoconjunctivitis sicca    ADHD, predominantly inattentive type  -     dextroamphetamine-amphetamine (ADDERALL) 30 mg Tab; Take 1 tablet (30 mg total) by mouth once daily.  Dispense: 30 tablet; Refill: 0    Neuropathy  -     gabapentin (NEURONTIN) 300 MG capsule; Take 1 capsule (300 mg total) by mouth 3 (three) times daily.  Dispense: 270 capsule; Refill: 1    Hypertension, unspecified type  -     lisinopriL (PRINIVIL,ZESTRIL) 20 MG tablet; Take 1 tablet (20 mg total) by mouth once daily.  Dispense: 90 tablet; Refill: 1  -     amLODIPine (NORVASC) 5 MG tablet; Take 1 tablet (5 mg total) by mouth 2 (two) times daily.   Dispense: 180 tablet; Refill: 1    Anxiety  -     ALPRAZolam (XANAX) 1 MG tablet; Take 1 tablet (1 mg total) by mouth 2 (two) times daily as needed for Anxiety.  Dispense: 180 tablet; Refill: 1    Insomnia, unspecified type  -     zolpidem (AMBIEN) 10 mg Tab; 1 tablet at bedtime  Dispense: 90 tablet; Refill: 1    High risk medication use    Foot pain, right    Fibromyalgia    Herpes zoster without complication    Other orders  -     omeprazole (PRILOSEC) 40 MG capsule; Take 1 capsule (40 mg total) by mouth once daily.  Dispense: 90 capsule; Refill: 3  -     methocarbamoL (ROBAXIN) 500 MG Tab; Take 1 tablet (500 mg total) by mouth 3 (three) times daily.  Dispense: 270 tablet; Refill: 1  -     montelukast (SINGULAIR) 10 mg tablet; Take 1 tablet (10 mg total) by mouth every evening.  Dispense: 90 tablet; Refill: 1  -     LIDOcaine 3.5 % Lotn; Apply 1 application topically 2 (two) times a day.  Dispense: 120 g; Refill: 0      Follow up in about 6 months (around 9/14/2022), or if symptoms worsen or fail to improve, for medication management.        3/17/2022 Trinity Alonzo

## 2022-06-22 ENCOUNTER — TELEPHONE (OUTPATIENT)
Dept: FAMILY MEDICINE | Facility: CLINIC | Age: 57
End: 2022-06-22

## 2022-06-22 RX ORDER — METHYLPREDNISOLONE 4 MG/1
TABLET ORAL
Qty: 21 EACH | Refills: 0 | Status: SHIPPED | OUTPATIENT
Start: 2022-06-22 | End: 2022-07-13

## 2022-06-22 NOTE — TELEPHONE ENCOUNTER
----- Message from Lilliam Lafleur sent at 6/22/2022 11:48 AM CDT -----  Patient called and stated that she would like to have a dose pack she has pain in her face around her nose, sinus headache and pressure and she has fluid on her ankle and the dose pack helps with that also she would like to have one called into [facility] on [facility] if any questions please give her a call at [phone]

## 2022-07-28 DIAGNOSIS — F90.0 ADHD, PREDOMINANTLY INATTENTIVE TYPE: ICD-10-CM

## 2022-07-28 RX ORDER — DEXTROAMPHETAMINE SACCHARATE, AMPHETAMINE ASPARTATE, DEXTROAMPHETAMINE SULFATE AND AMPHETAMINE SULFATE 7.5; 7.5; 7.5; 7.5 MG/1; MG/1; MG/1; MG/1
1 TABLET ORAL DAILY
Qty: 30 TABLET | Refills: 0 | Status: SHIPPED | OUTPATIENT
Start: 2022-08-27 | End: 2023-11-02

## 2022-07-28 RX ORDER — DEXTROAMPHETAMINE SACCHARATE, AMPHETAMINE ASPARTATE, DEXTROAMPHETAMINE SULFATE AND AMPHETAMINE SULFATE 7.5; 7.5; 7.5; 7.5 MG/1; MG/1; MG/1; MG/1
1 TABLET ORAL DAILY
Qty: 30 TABLET | Refills: 0 | Status: SHIPPED | OUTPATIENT
Start: 2022-07-28 | End: 2023-11-02 | Stop reason: SDUPTHER

## 2022-07-28 NOTE — TELEPHONE ENCOUNTER
----- Message from Erin Alvarez sent at 7/28/2022 10:40 AM CDT -----  The patient called back and said Trinity usually writes 3 separate Adderall prescriptions for her. If they don't have the prescription at her pharmacy she will take it to another Pharmacy. Please call when they are reafy. Pt's # [phone]

## 2022-07-28 NOTE — TELEPHONE ENCOUNTER
----- Message from Erin Alvarez sent at 7/28/2022 10:24 AM CDT -----   10:13   Refill Adderall pt's # [phone]

## 2022-08-04 LAB
PAP RECOMMENDATION EXT: NORMAL
PAP SMEAR: NORMAL

## 2022-08-22 NOTE — TELEPHONE ENCOUNTER
Spoke with pt and let her know the medrol dose pack was sent.    56 year old male with PMH type 2 DM, HTN, hypothyroidism, papillary thyroid cancer and dyslipidemia who presented to [facility] for scheduled left thyroidectomy with limited neck dissection, right modified radical neck dissection today.    Intraoperative course uneventful, post procedure patient reported to be moving all extremities by OR staff.  When electively extubated post operatively noted to be stridorous, immediate laryngoscopy significant for vocal cord paralysis.  Reintubated without incident and transferred to ICU for further management.    For transfer to SICU at [facility] for extubation with ENT team present.  This is only a summary of events during admission, please refer to full medical record for details of hospital stay 56 year old male with PMH type 2 DM, HTN, hypothyroidism, papillary thyroid cancer and dyslipidemia who presented to [facility] for scheduled left thyroidectomy with limited neck dissection, right modified radical neck dissection today.    Intraoperative course uneventful, post procedure patient reported to be moving all extremities by OR staff.  When electively extubated post operatively noted to be stridorous, immediate laryngoscopy significant for vocal cord paralysis.  Reintubated without incident and transferred to ICU for further management.    For transfer to SICU at [facility] for extubation with ENT team present.  This is only a summary of events during admission, please refer to full medical record for details of hospital stay

## 2022-09-14 ENCOUNTER — OFFICE VISIT (OUTPATIENT)
Dept: FAMILY MEDICINE | Facility: CLINIC | Age: 57
End: 2022-09-14

## 2022-09-14 VITALS
HEIGHT: 63 IN | HEART RATE: 84 BPM | SYSTOLIC BLOOD PRESSURE: 112 MMHG | DIASTOLIC BLOOD PRESSURE: 62 MMHG | WEIGHT: 107 LBS | BODY MASS INDEX: 18.96 KG/M2

## 2022-09-14 DIAGNOSIS — G47.00 INSOMNIA, UNSPECIFIED TYPE: ICD-10-CM

## 2022-09-14 DIAGNOSIS — F41.9 ANXIETY: ICD-10-CM

## 2022-09-14 DIAGNOSIS — F90.0 ADHD, PREDOMINANTLY INATTENTIVE TYPE: ICD-10-CM

## 2022-09-14 DIAGNOSIS — M79.7 FIBROMYALGIA: ICD-10-CM

## 2022-09-14 DIAGNOSIS — I10 HYPERTENSION, UNSPECIFIED TYPE: ICD-10-CM

## 2022-09-14 DIAGNOSIS — M25.50 POLYARTHRALGIA: ICD-10-CM

## 2022-09-14 DIAGNOSIS — Z79.899 ENCOUNTER FOR LONG-TERM (CURRENT) USE OF OTHER MEDICATIONS: Primary | ICD-10-CM

## 2022-09-14 DIAGNOSIS — G62.9 NEUROPATHY: ICD-10-CM

## 2022-09-14 DIAGNOSIS — M35.01 SJOGREN'S SYNDROME WITH KERATOCONJUNCTIVITIS SICCA: ICD-10-CM

## 2022-09-14 DIAGNOSIS — Z51.81 ENCOUNTER FOR THERAPEUTIC DRUG MONITORING: ICD-10-CM

## 2022-09-14 DIAGNOSIS — Z79.899 HIGH RISK MEDICATION USE: ICD-10-CM

## 2022-09-14 PROCEDURE — 99213 PR OFFICE/OUTPT VISIT, EST, LEVL III, 20-29 MIN: ICD-10-PCS | Mod: S$GLB,,, | Performed by: NURSE PRACTITIONER

## 2022-09-14 PROCEDURE — 99213 OFFICE O/P EST LOW 20 MIN: CPT | Mod: S$GLB,,, | Performed by: NURSE PRACTITIONER

## 2022-09-14 RX ORDER — METHOCARBAMOL 500 MG/1
500 TABLET, FILM COATED ORAL 3 TIMES DAILY
Qty: 270 TABLET | Refills: 1 | Status: SHIPPED | OUTPATIENT
Start: 2022-09-14 | End: 2023-11-02 | Stop reason: SDUPTHER

## 2022-09-14 RX ORDER — DEXTROAMPHETAMINE SACCHARATE, AMPHETAMINE ASPARTATE, DEXTROAMPHETAMINE SULFATE AND AMPHETAMINE SULFATE 7.5; 7.5; 7.5; 7.5 MG/1; MG/1; MG/1; MG/1
1 TABLET ORAL DAILY
Qty: 30 TABLET | Refills: 0 | Status: CANCELLED | OUTPATIENT
Start: 2022-11-13 | End: 2022-12-13

## 2022-09-14 RX ORDER — ALPRAZOLAM 1 MG/1
1 TABLET ORAL 2 TIMES DAILY PRN
Qty: 180 TABLET | Refills: 1 | Status: SHIPPED | OUTPATIENT
Start: 2022-09-14 | End: 2023-11-02 | Stop reason: SDUPTHER

## 2022-09-14 RX ORDER — DEXTROAMPHETAMINE SACCHARATE, AMPHETAMINE ASPARTATE, DEXTROAMPHETAMINE SULFATE AND AMPHETAMINE SULFATE 7.5; 7.5; 7.5; 7.5 MG/1; MG/1; MG/1; MG/1
1 TABLET ORAL DAILY
Qty: 30 TABLET | Refills: 0 | Status: CANCELLED | OUTPATIENT
Start: 2022-10-14 | End: 2022-11-13

## 2022-09-14 RX ORDER — GABAPENTIN 300 MG/1
300 CAPSULE ORAL 3 TIMES DAILY
Qty: 270 CAPSULE | Refills: 1 | Status: CANCELLED | OUTPATIENT
Start: 2022-09-14

## 2022-09-14 RX ORDER — AMLODIPINE BESYLATE 5 MG/1
5 TABLET ORAL 2 TIMES DAILY
Qty: 180 TABLET | Refills: 1 | Status: CANCELLED | OUTPATIENT
Start: 2022-09-14 | End: 2023-11-02

## 2022-09-14 RX ORDER — ZOLPIDEM TARTRATE 10 MG/1
TABLET ORAL
Qty: 90 TABLET | Refills: 1 | Status: CANCELLED | OUTPATIENT
Start: 2022-09-14

## 2022-09-14 RX ORDER — LISINOPRIL 20 MG/1
20 TABLET ORAL DAILY
Qty: 90 TABLET | Refills: 1 | Status: SHIPPED | OUTPATIENT
Start: 2022-09-14 | End: 2023-03-13 | Stop reason: SDUPTHER

## 2022-09-14 RX ORDER — HYDROCODONE BITARTRATE AND ACETAMINOPHEN 5; 325 MG/1; MG/1
1 TABLET ORAL EVERY 12 HOURS PRN
Qty: 60 TABLET | Refills: 0 | Status: SHIPPED | OUTPATIENT
Start: 2022-09-14 | End: 2023-03-16 | Stop reason: SDUPTHER

## 2022-09-14 RX ORDER — DEXTROAMPHETAMINE SACCHARATE, AMPHETAMINE ASPARTATE, DEXTROAMPHETAMINE SULFATE AND AMPHETAMINE SULFATE 7.5; 7.5; 7.5; 7.5 MG/1; MG/1; MG/1; MG/1
1 TABLET ORAL DAILY
Qty: 30 TABLET | Refills: 0 | Status: CANCELLED | OUTPATIENT
Start: 2022-09-14 | End: 2022-10-14

## 2022-09-14 NOTE — TELEPHONE ENCOUNTER
Chart shows last filled: 3/14/2022  Urine drug screen: Order in not done  Last office visit: 3/14/2022  Upcoming Office visit: Today 9/14/2022

## 2022-09-25 NOTE — PROGRESS NOTES
SUBJECTIVE:    Patient ID: Nataly Traore is a 56 y.o. female.    Chief Complaint: Medication Management (Brought bottles// SW)    56 year old female presents for check up. Treated for sjogrens, htn, insomnia, allergic rhinitis, adhd and chronic pain. Needs refills on medications.due for labs. Plans to have done a lallie uriostegui. Still trying to find rheumatologist. Does not currently having insurance. Has daily pain. Concerned that pain is progressing. Bp is well controlled in office. Reports that at home is up and down.       No visits with results within 6 Month(s) from this visit.   Latest known visit with results is:   Lab Visit on 01/18/2019   Component Date Value Ref Range Status    CRP 01/18/2019 2.1  0.0 - 8.2 mg/L Final    Complement (C-3) 01/18/2019 121  50 - 180 mg/dL Final    Complement (C-4) 01/18/2019 14  11 - 44 mg/dL Final    Immunofix Interp. 01/18/2019 SEE COMMENT   Final    Protein, Serum 01/18/2019 7.0  6.0 - 8.4 g/dL Final    Albumin 01/18/2019 3.96  3.35 - 5.55 g/dL Final    Alpha-1 01/18/2019 0.32  0.17 - 0.41 g/dL Final    Alpha-2 01/18/2019 0.76  0.43 - 0.99 g/dL Final    Beta 01/18/2019 0.74  0.50 - 1.10 g/dL Final    Gamma 01/18/2019 1.23  0.67 - 1.58 g/dL Final    Cryoglobulin, Qual 01/18/2019 Absent  Absent Final    RNA Polymerase III Antibodies, IgG* 01/18/2019 <10.0  <20.0 (Negative) U Final    Pathologist Interpretation SPE 01/18/2019 REVIEWED   Final    Pathologist Interpretation ELLEN 01/18/2019 REVIEWED   Final       Past Medical History:   Diagnosis Date    Arthritis     Hypertension      Social History     Socioeconomic History    Marital status:    Tobacco Use    Smoking status: Never    Smokeless tobacco: Never   Substance and Sexual Activity    Alcohol use: No    Drug use: No    Sexual activity: Yes     Partners: Male     History reviewed. No pertinent surgical history.  Family History   Problem Relation Age of Onset    Hypertension Mother     Diabetes Mother      Arthritis Mother     Kidney cancer Father     Hypertension Father     Heart disease Father        Review of patient's allergies indicates:   Allergen Reactions    Sulfa (sulfonamide antibiotics)        Current Outpatient Medications:     ascorbate calcium-bioflavonoid 1,000-200 mg Tab, Take 1 tablet by mouth., Disp: , Rfl:     dextroamphetamine-amphetamine (ADDERALL) 30 mg Tab, Take 1 tablet (30 mg total) by mouth once daily., Disp: 30 tablet, Rfl: 0    dextroamphetamine-amphetamine (ADDERALL) 30 mg Tab, Take 1 tablet (30 mg total) by mouth once daily., Disp: 30 tablet, Rfl: 0    fexofenadine (ALLEGRA) 180 MG tablet, Take 180 mg by mouth once daily., Disp: , Rfl:     fluticasone propionate (FLONASE) 50 mcg/actuation nasal spray, 1 spray (50 mcg total) by Each Nostril route once daily., Disp: 16 g, Rfl: 3    LIDOcaine 3.5 % Lotn, Apply 1 application topically 2 (two) times a day., Disp: 120 g, Rfl: 0    melatonin 5 mg Cap, Take by mouth., Disp: , Rfl:     montelukast (SINGULAIR) 10 mg tablet, Take 1 tablet (10 mg total) by mouth every evening., Disp: 90 tablet, Rfl: 1    multivitamin capsule, Take 1 capsule by mouth., Disp: , Rfl:     norgestimate-ethinyl estradioL (ORTHO-CYCLEN) 0.25-35 mg-mcg per tablet, Take 1 tablet by mouth., Disp: , Rfl:     omeprazole (PRILOSEC) 40 MG capsule, Take 1 capsule (40 mg total) by mouth once daily., Disp: 90 capsule, Rfl: 3    traMADoL (ULTRAM) 50 mg tablet, Take 1 tablet (50 mg total) by mouth every 8 (eight) hours as needed for Pain., Disp: 30 tablet, Rfl: 0    valACYclovir (VALTREX) 1000 MG tablet, Take 1 tablet (1,000 mg total) by mouth 3 (three) times daily. for 7 days, Disp: 21 tablet, Rfl: 0    ALPRAZolam (XANAX) 1 MG tablet, Take 1 tablet (1 mg total) by mouth 2 (two) times daily as needed for Anxiety., Disp: 180 tablet, Rfl: 1    HYDROcodone-acetaminophen (NORCO) 5-325 mg per tablet, Take 1 tablet by mouth every 12 (twelve) hours as needed for Pain., Disp: 60 tablet, Rfl:  "0    lisinopriL (PRINIVIL,ZESTRIL) 20 MG tablet, Take 1 tablet (20 mg total) by mouth once daily., Disp: 90 tablet, Rfl: 1    methocarbamoL (ROBAXIN) 500 MG Tab, Take 1 tablet (500 mg total) by mouth 3 (three) times daily., Disp: 270 tablet, Rfl: 1    Review of Systems   Constitutional:  Positive for unexpected weight change (has recently lost weight. not necessarily intentional but has changed diet). Negative for chills and fever.   HENT:  Negative for ear pain, rhinorrhea and sore throat.    Eyes:  Negative for pain and visual disturbance.   Respiratory:  Negative for cough and shortness of breath.    Cardiovascular:  Negative for chest pain, palpitations and leg swelling.   Gastrointestinal:  Negative for abdominal pain, diarrhea, nausea and vomiting.   Genitourinary:  Negative for difficulty urinating, hematuria and vaginal bleeding.   Musculoskeletal:  Positive for arthralgias.   Skin:  Negative for rash.   Neurological:  Negative for dizziness, weakness and headaches.   Psychiatric/Behavioral:  Negative for agitation and sleep disturbance. The patient is not nervous/anxious.         Objective:      Vitals:    09/14/22 0949   BP: 112/62   Pulse: 84   Weight: 48.5 kg (107 lb)   Height: 5' 3" (1.6 m)     Physical Exam  Vitals and nursing note reviewed.   Constitutional:       General: She is not in acute distress.     Appearance: Normal appearance. She is well-developed.      Comments: thin   HENT:      Right Ear: External ear normal.      Left Ear: External ear normal.   Eyes:      Conjunctiva/sclera: Conjunctivae normal.      Pupils: Pupils are equal, round, and reactive to light.   Neck:      Vascular: No JVD.   Cardiovascular:      Rate and Rhythm: Normal rate and regular rhythm.      Heart sounds: No murmur heard.  Pulmonary:      Effort: Pulmonary effort is normal.      Breath sounds: Normal breath sounds.   Abdominal:      General: Bowel sounds are normal.      Palpations: Abdomen is soft. "   Musculoskeletal:         General: No deformity. Normal range of motion.      Cervical back: Normal range of motion and neck supple.   Lymphadenopathy:      Cervical: No cervical adenopathy.   Skin:     General: Skin is warm and dry.      Findings: No rash.   Neurological:      Mental Status: She is alert and oriented to person, place, and time.      Gait: Gait normal.   Psychiatric:         Speech: Speech normal.         Behavior: Behavior normal.         Assessment:       1. Encounter for long-term (current) use of other medications    2. Hypertension, unspecified type    3. Anxiety    4. Insomnia, unspecified type    5. Neuropathy    6. ADHD, predominantly inattentive type    7. Encounter for therapeutic drug monitoring    8. Sjogren's syndrome with keratoconjunctivitis sicca    9. Fibromyalgia    10. High risk medication use    11. Polyarthralgia           Plan:       Encounter for long-term (current) use of other medications  -     Cancel: Pain Managment Profile 4, w/ Confirm, Ur; Future; Expected date: 09/14/2022  -     Cancel: Drug Tox Monitoring 1, W/Conf, Oral Fluid; Future; Expected date: 09/14/2022    Hypertension, unspecified type  -     lisinopriL (PRINIVIL,ZESTRIL) 20 MG tablet; Take 1 tablet (20 mg total) by mouth once daily.  Dispense: 90 tablet; Refill: 1    Anxiety  -     ALPRAZolam (XANAX) 1 MG tablet; Take 1 tablet (1 mg total) by mouth 2 (two) times daily as needed for Anxiety.  Dispense: 180 tablet; Refill: 1    Insomnia, unspecified type    Neuropathy    ADHD, predominantly inattentive type    Encounter for therapeutic drug monitoring  -     Cancel: Pain Managment Profile 4, w/ Confirm, Ur; Future; Expected date: 09/14/2022  -     Cancel: Drug Tox Monitoring 1, W/Conf, Oral Fluid; Future; Expected date: 09/14/2022    Sjogren's syndrome with keratoconjunctivitis sicca    Fibromyalgia    High risk medication use    Polyarthralgia    Other orders  -     methocarbamoL (ROBAXIN) 500 MG Tab; Take 1  tablet (500 mg total) by mouth 3 (three) times daily.  Dispense: 270 tablet; Refill: 1    Follow up in about 3 months (around 12/14/2022), or if symptoms worsen or fail to improve, for medication management.        9/25/2022 Trinity Alonzo

## 2022-10-19 ENCOUNTER — TELEPHONE (OUTPATIENT)
Dept: FAMILY MEDICINE | Facility: CLINIC | Age: 57
End: 2022-10-19

## 2022-10-19 NOTE — TELEPHONE ENCOUNTER
----- Message from Lilliam Lafleur sent at 10/19/2022 11:24 AM CDT -----  Patient called and stated that she was unable to get her dextroamphetamine-amphetamine filled she was not able to find a pharmacy that has it she want to know if the doctor want her to try something else or just wait for the shortage to be over  also she has a sinus infection and she would like to know if she could call in something for her at [facility] on [facility] please give her a call at [phone]

## 2022-10-20 RX ORDER — CEPHALEXIN 500 MG/1
500 CAPSULE ORAL EVERY 8 HOURS
Qty: 24 CAPSULE | Refills: 0 | Status: SHIPPED | OUTPATIENT
Start: 2022-10-20 | End: 2023-03-13

## 2022-11-02 ENCOUNTER — TELEPHONE (OUTPATIENT)
Dept: FAMILY MEDICINE | Facility: CLINIC | Age: 57
End: 2022-11-02

## 2022-11-02 NOTE — TELEPHONE ENCOUNTER
Spoke with pt who states she has been sick for about a week and was positive for covid last night. She currently has a cough, nasal congestion, weakness, sinus pressure, and a headache. She didn't know if there was anything that she can be taking for this. She is going to take otc meds and keep us updated on how shes feeling. No trouble breathing or chest pain.

## 2022-11-02 NOTE — TELEPHONE ENCOUNTER
----- Message from Jenifer Vance sent at 11/2/2022  1:41 PM CDT -----  - pt would like a call back about a positive covid test last night   [phone]

## 2023-02-24 ENCOUNTER — TELEPHONE (OUTPATIENT)
Dept: FAMILY MEDICINE | Facility: CLINIC | Age: 58
End: 2023-02-24

## 2023-02-24 DIAGNOSIS — Z79.899 ENCOUNTER FOR LONG-TERM (CURRENT) USE OF OTHER MEDICATIONS: ICD-10-CM

## 2023-02-24 DIAGNOSIS — Z00.00 ROUTINE MEDICAL EXAM: ICD-10-CM

## 2023-02-24 DIAGNOSIS — I10 HYPERTENSION, UNSPECIFIED TYPE: Primary | ICD-10-CM

## 2023-03-13 ENCOUNTER — OFFICE VISIT (OUTPATIENT)
Dept: FAMILY MEDICINE | Facility: CLINIC | Age: 58
End: 2023-03-13

## 2023-03-13 VITALS
WEIGHT: 107.63 LBS | BODY MASS INDEX: 19.81 KG/M2 | DIASTOLIC BLOOD PRESSURE: 70 MMHG | SYSTOLIC BLOOD PRESSURE: 120 MMHG | OXYGEN SATURATION: 98 % | HEART RATE: 91 BPM | HEIGHT: 62 IN

## 2023-03-13 DIAGNOSIS — M79.7 FIBROMYALGIA: ICD-10-CM

## 2023-03-13 DIAGNOSIS — Z79.899 HIGH RISK MEDICATION USE: ICD-10-CM

## 2023-03-13 DIAGNOSIS — I10 HYPERTENSION, UNSPECIFIED TYPE: ICD-10-CM

## 2023-03-13 DIAGNOSIS — F90.0 ADHD, PREDOMINANTLY INATTENTIVE TYPE: ICD-10-CM

## 2023-03-13 DIAGNOSIS — M25.50 POLYARTHRALGIA: ICD-10-CM

## 2023-03-13 DIAGNOSIS — Z79.899 ENCOUNTER FOR LONG-TERM (CURRENT) USE OF OTHER MEDICATIONS: ICD-10-CM

## 2023-03-13 DIAGNOSIS — T78.40XD ALLERGY, SUBSEQUENT ENCOUNTER: ICD-10-CM

## 2023-03-13 DIAGNOSIS — M35.01 SJOGREN'S SYNDROME WITH KERATOCONJUNCTIVITIS SICCA: ICD-10-CM

## 2023-03-13 DIAGNOSIS — G47.00 INSOMNIA, UNSPECIFIED TYPE: Primary | ICD-10-CM

## 2023-03-13 DIAGNOSIS — G62.9 NEUROPATHY: ICD-10-CM

## 2023-03-13 PROCEDURE — 99213 PR OFFICE/OUTPT VISIT, EST, LEVL III, 20-29 MIN: ICD-10-PCS | Mod: S$GLB,,, | Performed by: NURSE PRACTITIONER

## 2023-03-13 PROCEDURE — 99213 OFFICE O/P EST LOW 20 MIN: CPT | Mod: S$GLB,,, | Performed by: NURSE PRACTITIONER

## 2023-03-13 RX ORDER — ZOLPIDEM TARTRATE 10 MG/1
TABLET ORAL
Qty: 90 TABLET | Refills: 1 | Status: SHIPPED | OUTPATIENT
Start: 2023-03-13 | End: 2023-11-02

## 2023-03-13 RX ORDER — OMEPRAZOLE 40 MG/1
40 CAPSULE, DELAYED RELEASE ORAL DAILY
Qty: 90 CAPSULE | Refills: 3 | Status: SHIPPED | OUTPATIENT
Start: 2023-03-13 | End: 2023-11-02 | Stop reason: SDUPTHER

## 2023-03-13 RX ORDER — LISINOPRIL 20 MG/1
20 TABLET ORAL DAILY
Qty: 90 TABLET | Refills: 1 | Status: SHIPPED | OUTPATIENT
Start: 2023-03-13 | End: 2023-09-25 | Stop reason: SDUPTHER

## 2023-03-13 RX ORDER — TRAMADOL HYDROCHLORIDE 50 MG/1
50 TABLET ORAL EVERY 8 HOURS PRN
Qty: 30 TABLET | Refills: 0 | Status: SHIPPED | OUTPATIENT
Start: 2023-03-13

## 2023-03-13 RX ORDER — MONTELUKAST SODIUM 10 MG/1
10 TABLET ORAL NIGHTLY
Qty: 90 TABLET | Refills: 1 | Status: SHIPPED | OUTPATIENT
Start: 2023-03-13 | End: 2023-10-26 | Stop reason: SDUPTHER

## 2023-03-13 RX ORDER — AMLODIPINE BESYLATE 5 MG/1
5 TABLET ORAL DAILY
Qty: 90 TABLET | Refills: 1 | Status: SHIPPED | OUTPATIENT
Start: 2023-03-13 | End: 2023-04-12

## 2023-03-13 RX ORDER — AMLODIPINE BESYLATE 5 MG/1
5 TABLET ORAL DAILY
COMMUNITY
End: 2023-03-13 | Stop reason: SDUPTHER

## 2023-03-13 NOTE — PROGRESS NOTES
SUBJECTIVE:    Patient ID: Nataly Traore is a 57 y.o. female.    Chief Complaint: Follow-up (Bottles brought/Pt c/o of HA from dropping a vent on her head, back, and ankle pain. Overall pain level 7./Med refill/Stress test and lab will be done Wed/BG)    57 year old female presents for check up. Treated for sjogrens, htn, insomnia, allergic rhinitis, adhd and chronic pain. Needs refills on medications.had labs done a telly uriostegui. Still trying to find rheumatologist. Was seen by cardiology. Scheduled for stress test. Still has all over pain. Some days better than others. Suffers with chronic allergies. Taking antihistamine with improvement      No visits with results within 6 Month(s) from this visit.   Latest known visit with results is:   Lab Visit on 01/18/2019   Component Date Value Ref Range Status    CRP 01/18/2019 2.1  0.0 - 8.2 mg/L Final    Complement (C-3) 01/18/2019 121  50 - 180 mg/dL Final    Complement (C-4) 01/18/2019 14  11 - 44 mg/dL Final    Immunofix Interp. 01/18/2019 SEE COMMENT   Final    Protein, Serum 01/18/2019 7.0  6.0 - 8.4 g/dL Final    Albumin 01/18/2019 3.96  3.35 - 5.55 g/dL Final    Alpha-1 01/18/2019 0.32  0.17 - 0.41 g/dL Final    Alpha-2 01/18/2019 0.76  0.43 - 0.99 g/dL Final    Beta 01/18/2019 0.74  0.50 - 1.10 g/dL Final    Gamma 01/18/2019 1.23  0.67 - 1.58 g/dL Final    Cryoglobulin, Qual 01/18/2019 Absent  Absent Final    RNA Polymerase III Antibodies, IgG* 01/18/2019 <10.0  <20.0 (Negative) U Final    Pathologist Interpretation SPE 01/18/2019 REVIEWED   Final    Pathologist Interpretation ELLEN 01/18/2019 REVIEWED   Final       Past Medical History:   Diagnosis Date    Arthritis     Hypertension      Social History     Socioeconomic History    Marital status:    Tobacco Use    Smoking status: Never    Smokeless tobacco: Never   Substance and Sexual Activity    Alcohol use: No    Drug use: No    Sexual activity: Yes     Partners: Male     History reviewed. No  pertinent surgical history.  Family History   Problem Relation Age of Onset    Hypertension Mother     Diabetes Mother     Arthritis Mother     Kidney cancer Father     Hypertension Father     Heart disease Father        Review of patient's allergies indicates:   Allergen Reactions    Sulfa (sulfonamide antibiotics)        Current Outpatient Medications:     ALPRAZolam (XANAX) 1 MG tablet, Take 1 tablet (1 mg total) by mouth 2 (two) times daily as needed for Anxiety., Disp: 180 tablet, Rfl: 1    ascorbate calcium-bioflavonoid 1,000-200 mg Tab, Take 1 tablet by mouth., Disp: , Rfl:     dextroamphetamine-amphetamine (ADDERALL) 30 mg Tab, Take 1 tablet (30 mg total) by mouth once daily., Disp: 30 tablet, Rfl: 0    dextroamphetamine-amphetamine (ADDERALL) 30 mg Tab, Take 1 tablet (30 mg total) by mouth once daily., Disp: 30 tablet, Rfl: 0    fexofenadine (ALLEGRA) 180 MG tablet, Take 180 mg by mouth once daily., Disp: , Rfl:     fluticasone propionate (FLONASE) 50 mcg/actuation nasal spray, 1 spray (50 mcg total) by Each Nostril route once daily., Disp: 16 g, Rfl: 3    methocarbamoL (ROBAXIN) 500 MG Tab, Take 1 tablet (500 mg total) by mouth 3 (three) times daily., Disp: 270 tablet, Rfl: 1    multivitamin capsule, Take 1 capsule by mouth., Disp: , Rfl:     amLODIPine (NORVASC) 5 MG tablet, Take 1 tablet (5 mg total) by mouth once daily., Disp: 90 tablet, Rfl: 1    HYDROcodone-acetaminophen (NORCO) 5-325 mg per tablet, Take 1 tablet by mouth every 12 (twelve) hours as needed for Pain., Disp: 60 tablet, Rfl: 0    LIDOcaine 3.5 % Lotn, Apply 1 application topically 2 (two) times a day., Disp: 120 g, Rfl: 0    lisinopriL (PRINIVIL,ZESTRIL) 20 MG tablet, Take 1 tablet (20 mg total) by mouth once daily., Disp: 90 tablet, Rfl: 1    melatonin 5 mg Cap, Take by mouth., Disp: , Rfl:     methylPREDNISolone (MEDROL DOSEPACK) 4 mg tablet, use as directed, Disp: 21 each, Rfl: 0    montelukast (SINGULAIR) 10 mg tablet, Take 1 tablet  "(10 mg total) by mouth every evening., Disp: 90 tablet, Rfl: 1    norgestimate-ethinyl estradioL (ORTHO-CYCLEN) 0.25-35 mg-mcg per tablet, Take 1 tablet by mouth., Disp: , Rfl:     omeprazole (PRILOSEC) 40 MG capsule, Take 1 capsule (40 mg total) by mouth once daily., Disp: 90 capsule, Rfl: 3    traMADoL (ULTRAM) 50 mg tablet, Take 1 tablet (50 mg total) by mouth every 8 (eight) hours as needed for Pain., Disp: 30 tablet, Rfl: 0    valACYclovir (VALTREX) 1000 MG tablet, Take 1 tablet (1,000 mg total) by mouth 3 (three) times daily. for 7 days, Disp: 21 tablet, Rfl: 0    zolpidem (AMBIEN) 10 mg Tab, 1 tablet at bedtime, Disp: 90 tablet, Rfl: 1    Review of Systems   Constitutional:  Negative for chills, fever and unexpected weight change.   HENT:  Positive for rhinorrhea and sinus pressure. Negative for ear pain and sore throat.    Eyes:  Negative for pain and visual disturbance.   Respiratory:  Negative for cough and shortness of breath.    Cardiovascular:  Negative for chest pain, palpitations and leg swelling.   Gastrointestinal:  Negative for abdominal pain, diarrhea, nausea and vomiting.   Genitourinary:  Negative for difficulty urinating, hematuria and vaginal bleeding.   Musculoskeletal:  Positive for arthralgias.   Skin:  Negative for rash.   Neurological:  Negative for dizziness, weakness and headaches.   Psychiatric/Behavioral:  Negative for agitation and sleep disturbance. The patient is not nervous/anxious.         Objective:      Vitals:    03/13/23 0932   BP: 120/70   Pulse: 91   SpO2: 98%   Weight: 48.8 kg (107 lb 9.6 oz)   Height: 5' 2" (1.575 m)     Physical Exam  Vitals and nursing note reviewed.   Constitutional:       Appearance: Normal appearance. She is well-developed.   HENT:      Head: Normocephalic.      Right Ear: External ear normal.      Left Ear: External ear normal.      Nose: Congestion and rhinorrhea present.      Right Turbinates: Pale.      Right Sinus: Frontal sinus tenderness " present.      Left Sinus: Frontal sinus tenderness present.   Neck:      Vascular: No JVD.   Cardiovascular:      Rate and Rhythm: Normal rate and regular rhythm.      Heart sounds: No murmur heard.  Pulmonary:      Effort: Pulmonary effort is normal.      Breath sounds: Normal breath sounds.   Abdominal:      General: Bowel sounds are normal.      Palpations: Abdomen is soft.   Musculoskeletal:         General: No deformity. Normal range of motion.      Right hand: Bony tenderness present.      Left hand: Bony tenderness present.      Cervical back: Normal range of motion and neck supple.   Lymphadenopathy:      Cervical: No cervical adenopathy.   Skin:     General: Skin is warm and dry.      Findings: No rash.   Neurological:      Mental Status: She is oriented to person, place, and time.      Gait: Gait normal.   Psychiatric:         Speech: Speech normal.         Behavior: Behavior normal.         Assessment:       1. Insomnia, unspecified type    2. Hypertension, unspecified type    3. Neuropathy    4. High risk medication use    5. Fibromyalgia    6. Polyarthralgia    7. Sjogren's syndrome with keratoconjunctivitis sicca    8. ADHD, predominantly inattentive type    9. Encounter for long-term (current) use of other medications    10. Allergy, subsequent encounter           Plan:       Insomnia, unspecified type  -     zolpidem (AMBIEN) 10 mg Tab; 1 tablet at bedtime  Dispense: 90 tablet; Refill: 1    Hypertension, unspecified type  -     amLODIPine (NORVASC) 5 MG tablet; Take 1 tablet (5 mg total) by mouth once daily.  Dispense: 90 tablet; Refill: 1  -     lisinopriL (PRINIVIL,ZESTRIL) 20 MG tablet; Take 1 tablet (20 mg total) by mouth once daily.  Dispense: 90 tablet; Refill: 1    Neuropathy    High risk medication use  -     omeprazole (PRILOSEC) 40 MG capsule; Take 1 capsule (40 mg total) by mouth once daily.  Dispense: 90 capsule; Refill: 3  -     montelukast (SINGULAIR) 10 mg tablet; Take 1 tablet (10 mg  total) by mouth every evening.  Dispense: 90 tablet; Refill: 1  -     amLODIPine (NORVASC) 5 MG tablet; Take 1 tablet (5 mg total) by mouth once daily.  Dispense: 90 tablet; Refill: 1  -     zolpidem (AMBIEN) 10 mg Tab; 1 tablet at bedtime  Dispense: 90 tablet; Refill: 1  -     lisinopriL (PRINIVIL,ZESTRIL) 20 MG tablet; Take 1 tablet (20 mg total) by mouth once daily.  Dispense: 90 tablet; Refill: 1    Fibromyalgia  -     omeprazole (PRILOSEC) 40 MG capsule; Take 1 capsule (40 mg total) by mouth once daily.  Dispense: 90 capsule; Refill: 3  -     montelukast (SINGULAIR) 10 mg tablet; Take 1 tablet (10 mg total) by mouth every evening.  Dispense: 90 tablet; Refill: 1  -     amLODIPine (NORVASC) 5 MG tablet; Take 1 tablet (5 mg total) by mouth once daily.  Dispense: 90 tablet; Refill: 1  -     zolpidem (AMBIEN) 10 mg Tab; 1 tablet at bedtime  Dispense: 90 tablet; Refill: 1  -     lisinopriL (PRINIVIL,ZESTRIL) 20 MG tablet; Take 1 tablet (20 mg total) by mouth once daily.  Dispense: 90 tablet; Refill: 1    Polyarthralgia    Sjogren's syndrome with keratoconjunctivitis sicca    ADHD, predominantly inattentive type    Encounter for long-term (current) use of other medications    Allergy, subsequent encounter    Other orders  -     traMADoL (ULTRAM) 50 mg tablet; Take 1 tablet (50 mg total) by mouth every 8 (eight) hours as needed for Pain.  Dispense: 30 tablet; Refill: 0      Follow up in about 3 months (around 6/13/2023) for medication management.        3/20/2023 Trinity Alonzo       [place]bar[place]

## 2023-03-15 DIAGNOSIS — Z12.31 OTHER SCREENING MAMMOGRAM: ICD-10-CM

## 2023-03-16 DIAGNOSIS — M79.7 FIBROMYALGIA: Primary | ICD-10-CM

## 2023-03-16 RX ORDER — METHYLPREDNISOLONE 4 MG/1
TABLET ORAL
Qty: 21 EACH | Refills: 0 | Status: SHIPPED | OUTPATIENT
Start: 2023-03-16 | End: 2023-04-06

## 2023-03-16 RX ORDER — HYDROCODONE BITARTRATE AND ACETAMINOPHEN 5; 325 MG/1; MG/1
1 TABLET ORAL EVERY 12 HOURS PRN
Qty: 60 TABLET | Refills: 0 | Status: SHIPPED | OUTPATIENT
Start: 2023-03-16

## 2023-03-16 NOTE — TELEPHONE ENCOUNTER
Spoke with pt let her know the medrol dose pack was sent in to [facility] on [address]. Also pt only takes Norco when needed and rx is set up for you to send in

## 2023-03-16 NOTE — TELEPHONE ENCOUNTER
----- Message from Jenifer Vance sent at 3/16/2023 10:36 AM CDT -----  Vm- pt was seen on Monday and she did not get a medrol dose pack   [phone]

## 2023-03-27 ENCOUNTER — TELEPHONE (OUTPATIENT)
Dept: FAMILY MEDICINE | Facility: CLINIC | Age: 58
End: 2023-03-27

## 2023-04-11 ENCOUNTER — PATIENT MESSAGE (OUTPATIENT)
Dept: ADMINISTRATIVE | Facility: HOSPITAL | Age: 58
End: 2023-04-11
Payer: COMMERCIAL

## 2023-06-12 ENCOUNTER — TELEPHONE (OUTPATIENT)
Dept: FAMILY MEDICINE | Facility: CLINIC | Age: 58
End: 2023-06-12

## 2023-06-12 RX ORDER — METHYLPREDNISOLONE 4 MG/1
TABLET ORAL
Qty: 21 EACH | Refills: 0 | Status: SHIPPED | OUTPATIENT
Start: 2023-06-12 | End: 2023-07-03

## 2023-06-12 NOTE — TELEPHONE ENCOUNTER
----- Message from Jenifer Vance sent at 6/12/2023  9:17 AM CDT -----  Vm- 8:25-pt would like to ask question about laryngitis   [phone]

## 2023-06-12 NOTE — TELEPHONE ENCOUNTER
"Spoke with patient, states she thought she had allergies that started about a week ago. Sore throat, she can feel a drip. This morning she feels like she is starting to get better but she states she has so much "fluid" and sinus pressure/headache. Her face is sore, has a lot of swelling in her face. If she leans her head back she can "hear the ocean"   Taking Flonase, Mucinex, Sudafed, Allegra  "

## 2023-06-26 ENCOUNTER — TELEPHONE (OUTPATIENT)
Dept: FAMILY MEDICINE | Facility: CLINIC | Age: 58
End: 2023-06-26

## 2023-06-26 NOTE — TELEPHONE ENCOUNTER
----- Message from Lilliam Lafleur sent at 6/26/2023 10:33 AM CDT -----  Patient called and stated that she completed the round of the medrol dose pack that she was given for her sinus infection but she still is having symptoms and she would like to see about calling another prescription for the medrol dose pack into [facility] on [facility] if any questions please give her a call at [phone]

## 2023-06-26 NOTE — TELEPHONE ENCOUNTER
Patient notified no appointment today or in the upcoming week and we are recommending urgent care. If she is ok with urgent we recommend [facility] that way we could see any test or medications that would be prescribe. Patient ok with going to urgent care.

## 2023-09-25 DIAGNOSIS — I10 HYPERTENSION, UNSPECIFIED TYPE: ICD-10-CM

## 2023-09-25 DIAGNOSIS — M79.7 FIBROMYALGIA: ICD-10-CM

## 2023-09-25 DIAGNOSIS — Z79.899 HIGH RISK MEDICATION USE: ICD-10-CM

## 2023-09-25 RX ORDER — LISINOPRIL 20 MG/1
20 TABLET ORAL DAILY
Qty: 30 TABLET | Refills: 0 | Status: SHIPPED | OUTPATIENT
Start: 2023-09-25 | End: 2023-10-26 | Stop reason: SDUPTHER

## 2023-09-25 NOTE — TELEPHONE ENCOUNTER
----- Message from Jenifer Vance sent at 9/25/2023 10:10 AM CDT -----  - 8:55-pt needs refill on lisinopril   [phone]

## 2023-10-26 DIAGNOSIS — Z79.899 HIGH RISK MEDICATION USE: ICD-10-CM

## 2023-10-26 DIAGNOSIS — M79.7 FIBROMYALGIA: ICD-10-CM

## 2023-10-26 DIAGNOSIS — I10 HYPERTENSION, UNSPECIFIED TYPE: ICD-10-CM

## 2023-10-26 RX ORDER — LISINOPRIL 20 MG/1
20 TABLET ORAL DAILY
Qty: 30 TABLET | Refills: 0 | Status: SHIPPED | OUTPATIENT
Start: 2023-10-26 | End: 2023-11-02 | Stop reason: SDUPTHER

## 2023-10-26 RX ORDER — MONTELUKAST SODIUM 10 MG/1
10 TABLET ORAL NIGHTLY
Qty: 30 TABLET | Refills: 0 | Status: SHIPPED | OUTPATIENT
Start: 2023-10-26 | End: 2023-11-02 | Stop reason: SDUPTHER

## 2023-10-26 NOTE — TELEPHONE ENCOUNTER
----- Message from Erin Alvarez sent at 10/26/2023  9:20 AM CDT -----  Refill Singular and Lisinopril [facility] Please make sure this pharmacy is in her chart. She has an appointment next week. pt's # [phone] GH

## 2023-11-02 ENCOUNTER — OFFICE VISIT (OUTPATIENT)
Dept: FAMILY MEDICINE | Facility: CLINIC | Age: 58
End: 2023-11-02

## 2023-11-02 VITALS
OXYGEN SATURATION: 99 % | BODY MASS INDEX: 20.8 KG/M2 | HEIGHT: 62 IN | WEIGHT: 113 LBS | DIASTOLIC BLOOD PRESSURE: 74 MMHG | HEART RATE: 86 BPM | SYSTOLIC BLOOD PRESSURE: 112 MMHG | RESPIRATION RATE: 14 BRPM

## 2023-11-02 DIAGNOSIS — G47.00 INSOMNIA, UNSPECIFIED TYPE: ICD-10-CM

## 2023-11-02 DIAGNOSIS — M35.01 SJOGREN'S SYNDROME WITH KERATOCONJUNCTIVITIS SICCA: ICD-10-CM

## 2023-11-02 DIAGNOSIS — Z79.899 HIGH RISK MEDICATION USE: ICD-10-CM

## 2023-11-02 DIAGNOSIS — M79.7 FIBROMYALGIA: ICD-10-CM

## 2023-11-02 DIAGNOSIS — I10 HYPERTENSION, UNSPECIFIED TYPE: ICD-10-CM

## 2023-11-02 DIAGNOSIS — K21.9 GASTROESOPHAGEAL REFLUX DISEASE, UNSPECIFIED WHETHER ESOPHAGITIS PRESENT: ICD-10-CM

## 2023-11-02 DIAGNOSIS — F90.0 ADHD, PREDOMINANTLY INATTENTIVE TYPE: Primary | ICD-10-CM

## 2023-11-02 DIAGNOSIS — F41.9 ANXIETY: ICD-10-CM

## 2023-11-02 PROCEDURE — 99213 OFFICE O/P EST LOW 20 MIN: CPT | Mod: S$GLB,,, | Performed by: NURSE PRACTITIONER

## 2023-11-02 PROCEDURE — 99213 PR OFFICE/OUTPT VISIT, EST, LEVL III, 20-29 MIN: ICD-10-PCS | Mod: S$GLB,,, | Performed by: NURSE PRACTITIONER

## 2023-11-02 RX ORDER — ZOLPIDEM TARTRATE 10 MG/1
TABLET ORAL
Qty: 90 TABLET | Refills: 1 | Status: SHIPPED | OUTPATIENT
Start: 2023-11-02

## 2023-11-02 RX ORDER — GABAPENTIN 300 MG/1
300 CAPSULE ORAL 3 TIMES DAILY
Qty: 270 CAPSULE | Refills: 1 | Status: SHIPPED | OUTPATIENT
Start: 2023-11-02

## 2023-11-02 RX ORDER — AMLODIPINE BESYLATE 5 MG/1
5 TABLET ORAL 2 TIMES DAILY
Qty: 180 TABLET | Refills: 1 | Status: SHIPPED | OUTPATIENT
Start: 2023-11-02 | End: 2023-11-02

## 2023-11-02 RX ORDER — ALBUTEROL SULFATE 90 UG/1
2 AEROSOL, METERED RESPIRATORY (INHALATION) EVERY 6 HOURS PRN
Qty: 18 G | Refills: 0 | Status: SHIPPED | OUTPATIENT
Start: 2023-11-02 | End: 2024-11-01

## 2023-11-02 RX ORDER — ALPRAZOLAM 1 MG/1
1 TABLET ORAL 2 TIMES DAILY PRN
Qty: 180 TABLET | Refills: 0 | Status: SHIPPED | OUTPATIENT
Start: 2023-11-02

## 2023-11-02 RX ORDER — AMLODIPINE BESYLATE 10 MG/1
5 TABLET ORAL 2 TIMES DAILY
Qty: 30 TABLET | Refills: 2 | Status: SHIPPED | OUTPATIENT
Start: 2023-11-02 | End: 2024-01-26 | Stop reason: SDUPTHER

## 2023-11-02 RX ORDER — MONTELUKAST SODIUM 10 MG/1
10 TABLET ORAL NIGHTLY
Qty: 30 TABLET | Refills: 0 | Status: SHIPPED | OUTPATIENT
Start: 2023-11-02 | End: 2023-12-29 | Stop reason: SDUPTHER

## 2023-11-02 RX ORDER — DEXTROAMPHETAMINE SACCHARATE, AMPHETAMINE ASPARTATE, DEXTROAMPHETAMINE SULFATE AND AMPHETAMINE SULFATE 5; 5; 5; 5 MG/1; MG/1; MG/1; MG/1
1 TABLET ORAL DAILY
Qty: 30 TABLET | Refills: 0 | Status: SHIPPED | OUTPATIENT
Start: 2023-11-02 | End: 2023-11-09

## 2023-11-02 RX ORDER — OMEPRAZOLE 40 MG/1
40 CAPSULE, DELAYED RELEASE ORAL DAILY
Qty: 90 CAPSULE | Refills: 3 | Status: SHIPPED | OUTPATIENT
Start: 2023-11-02 | End: 2024-11-01

## 2023-11-02 RX ORDER — CALCIUM CARBONATE 600 MG
600 TABLET ORAL
COMMUNITY
End: 2023-11-02

## 2023-11-02 RX ORDER — ZINC GLUCONATE 50 MG
50 TABLET ORAL
COMMUNITY
End: 2023-11-02

## 2023-11-02 RX ORDER — CALCIUM CARBONATE/VITAMIN D3 250-3.125
TABLET ORAL
COMMUNITY
End: 2023-11-02

## 2023-11-02 RX ORDER — LISINOPRIL 20 MG/1
20 TABLET ORAL DAILY
Qty: 30 TABLET | Refills: 0 | Status: SHIPPED | OUTPATIENT
Start: 2023-11-02 | End: 2023-12-29 | Stop reason: SDUPTHER

## 2023-11-02 RX ORDER — METHOCARBAMOL 500 MG/1
500 TABLET, FILM COATED ORAL 3 TIMES DAILY
Qty: 270 TABLET | Refills: 1 | Status: SHIPPED | OUTPATIENT
Start: 2023-11-02

## 2023-11-02 RX ORDER — ATORVASTATIN CALCIUM 20 MG/1
20 TABLET, FILM COATED ORAL
COMMUNITY
Start: 2022-08-04 | End: 2023-11-02

## 2023-11-02 NOTE — PROGRESS NOTES
SUBJECTIVE:    Patient ID: Nataly Traore is a 57 y.o. female.    Chief Complaint: Medication Refill    57 year old female presents for check up. Treated for sjogrens, htn, insomnia, allergic rhinitis, adhd and chronic pain. Needs refills on medications.had labs done a telly uriostegui. Was seen by cardiology. Stress test was negative. Been ok since. Has chronic allergies. Sleeps ok with ambien. Takes adderall prn. Denies side effects.         No visits with results within 6 Month(s) from this visit.   Latest known visit with results is:   Lab Visit on 01/18/2019   Component Date Value Ref Range Status    CRP 01/18/2019 2.1  0.0 - 8.2 mg/L Final    Complement (C-3) 01/18/2019 121  50 - 180 mg/dL Final    Complement (C-4) 01/18/2019 14  11 - 44 mg/dL Final    Immunofix Interp. 01/18/2019 SEE COMMENT   Final    Protein, Serum 01/18/2019 7.0  6.0 - 8.4 g/dL Final    Albumin 01/18/2019 3.96  3.35 - 5.55 g/dL Final    Alpha-1 01/18/2019 0.32  0.17 - 0.41 g/dL Final    Alpha-2 01/18/2019 0.76  0.43 - 0.99 g/dL Final    Beta 01/18/2019 0.74  0.50 - 1.10 g/dL Final    Gamma 01/18/2019 1.23  0.67 - 1.58 g/dL Final    Cryoglobulin, Qual 01/18/2019 Absent  Absent Final    RNA Polymerase III Antibodies, IgG* 01/18/2019 <10.0  <20.0 (Negative) U Final    Pathologist Interpretation SPE 01/18/2019 REVIEWED   Final    Pathologist Interpretation ELLEN 01/18/2019 REVIEWED   Final       Past Medical History:   Diagnosis Date    Arthritis     Hypertension      Social History     Socioeconomic History    Marital status:    Tobacco Use    Smoking status: Never    Smokeless tobacco: Never   Substance and Sexual Activity    Alcohol use: No    Drug use: No    Sexual activity: Yes     Partners: Male     History reviewed. No pertinent surgical history.  Family History   Problem Relation Age of Onset    Hypertension Mother     Diabetes Mother     Arthritis Mother     Kidney cancer Father     Hypertension Father     Heart disease Father         Tests to Keep You Healthy    Mammogram: ORDERED BUT NOT SCHEDULED  Colon Cancer Screening: DUE  Cervical Cancer Screening: DUE  Last Blood Pressure <= 139/89 (11/2/2023): Yes      Review of patient's allergies indicates:   Allergen Reactions    Sulfa (sulfonamide antibiotics)        Current Outpatient Medications:     fexofenadine (ALLEGRA) 180 MG tablet, Take 180 mg by mouth once daily., Disp: , Rfl:     fluticasone propionate (FLONASE) 50 mcg/actuation nasal spray, 1 spray (50 mcg total) by Each Nostril route once daily., Disp: 16 g, Rfl: 3    HYDROcodone-acetaminophen (NORCO) 5-325 mg per tablet, Take 1 tablet by mouth every 12 (twelve) hours as needed for Pain., Disp: 60 tablet, Rfl: 0    melatonin 5 mg Cap, Take by mouth., Disp: , Rfl:     multivitamin capsule, Take 1 capsule by mouth., Disp: , Rfl:     traMADoL (ULTRAM) 50 mg tablet, Take 1 tablet (50 mg total) by mouth every 8 (eight) hours as needed for Pain., Disp: 30 tablet, Rfl: 0    albuterol (VENTOLIN HFA) 90 mcg/actuation inhaler, Inhale 2 puffs into the lungs every 6 (six) hours as needed for Wheezing. Rescue, Disp: 18 g, Rfl: 0    ALPRAZolam (XANAX) 1 MG tablet, Take 1 tablet (1 mg total) by mouth 2 (two) times daily as needed for Anxiety., Disp: 180 tablet, Rfl: 0    amLODIPine (NORVASC) 10 MG tablet, Take 0.5 tablets (5 mg total) by mouth 2 (two) times daily., Disp: 30 tablet, Rfl: 2    dextroamphetamine-amphetamine (ADDERALL) 30 mg Tab, Take 1 tablet (30 mg total) by mouth Daily., Disp: 30 tablet, Rfl: 0    gabapentin (NEURONTIN) 300 MG capsule, Take 1 capsule (300 mg total) by mouth 3 (three) times daily., Disp: 270 capsule, Rfl: 1    lisinopriL (PRINIVIL,ZESTRIL) 20 MG tablet, Take 1 tablet (20 mg total) by mouth once daily., Disp: 30 tablet, Rfl: 0    methocarbamoL (ROBAXIN) 500 MG Tab, Take 1 tablet (500 mg total) by mouth 3 (three) times daily., Disp: 270 tablet, Rfl: 1    montelukast (SINGULAIR) 10 mg tablet, Take 1 tablet (10 mg  "total) by mouth every evening., Disp: 30 tablet, Rfl: 0    omeprazole (PRILOSEC) 40 MG capsule, Take 1 capsule (40 mg total) by mouth once daily., Disp: 90 capsule, Rfl: 3    zolpidem (AMBIEN) 10 mg Tab, 1 tablet at bedtime, Disp: 90 tablet, Rfl: 1    Review of Systems   Constitutional:  Negative for chills, fever and unexpected weight change.   HENT:  Negative for ear pain, rhinorrhea and sore throat.    Eyes:  Negative for pain and visual disturbance.   Respiratory:  Negative for cough and shortness of breath.    Cardiovascular:  Negative for chest pain, palpitations and leg swelling.   Gastrointestinal:  Negative for abdominal pain, diarrhea, nausea and vomiting.   Genitourinary:  Negative for difficulty urinating, hematuria and vaginal bleeding.   Musculoskeletal:  Positive for arthralgias.   Skin:  Negative for rash.   Neurological:  Negative for dizziness, weakness and headaches.   Psychiatric/Behavioral:  Negative for agitation and sleep disturbance. The patient is not nervous/anxious.           Objective:      Vitals:    11/02/23 0938   BP: 112/74   Pulse: 86   Resp: 14   SpO2: 99%   Weight: 51.3 kg (113 lb)   Height: 5' 2" (1.575 m)     Physical Exam  Vitals and nursing note reviewed.   Constitutional:       General: She is not in acute distress.     Appearance: Normal appearance. She is well-developed.   HENT:      Head: Normocephalic.      Right Ear: External ear normal.      Left Ear: External ear normal.      Nose:      Right Turbinates: Pale.      Left Turbinates: Pale.   Eyes:      Conjunctiva/sclera: Conjunctivae normal.      Pupils: Pupils are equal, round, and reactive to light.   Neck:      Vascular: No JVD.   Cardiovascular:      Rate and Rhythm: Normal rate and regular rhythm.      Heart sounds: No murmur heard.  Pulmonary:      Effort: Pulmonary effort is normal.      Breath sounds: Normal breath sounds.   Abdominal:      General: Bowel sounds are normal.      Palpations: Abdomen is soft. "   Musculoskeletal:         General: No deformity. Normal range of motion.      Cervical back: Normal range of motion and neck supple.   Lymphadenopathy:      Cervical: No cervical adenopathy.   Skin:     General: Skin is warm and dry.      Findings: No rash.   Neurological:      Mental Status: She is alert and oriented to person, place, and time.      Gait: Gait normal.   Psychiatric:         Speech: Speech normal.         Behavior: Behavior normal.           Assessment:       1. ADHD, predominantly inattentive type    2. Hypertension, unspecified type    3. High risk medication use    4. Fibromyalgia    5. Anxiety    6. Sjogren's syndrome with keratoconjunctivitis sicca    7. Insomnia, unspecified type    8. Gastroesophageal reflux disease, unspecified whether esophagitis present         Plan:       ADHD, predominantly inattentive type  Comments:  adderall prn  Orders:  -     Discontinue: dextroamphetamine-amphetamine (ADDERALL) 20 mg tablet; Take 1 tablet by mouth once daily.  Dispense: 30 tablet; Refill: 0    Hypertension, unspecified type  Comments:  bp in office controlled  Orders:  -     lisinopriL (PRINIVIL,ZESTRIL) 20 MG tablet; Take 1 tablet (20 mg total) by mouth once daily.  Dispense: 30 tablet; Refill: 0    High risk medication use  -     lisinopriL (PRINIVIL,ZESTRIL) 20 MG tablet; Take 1 tablet (20 mg total) by mouth once daily.  Dispense: 30 tablet; Refill: 0  -     montelukast (SINGULAIR) 10 mg tablet; Take 1 tablet (10 mg total) by mouth every evening.  Dispense: 30 tablet; Refill: 0  -     omeprazole (PRILOSEC) 40 MG capsule; Take 1 capsule (40 mg total) by mouth once daily.  Dispense: 90 capsule; Refill: 3    Fibromyalgia  -     lisinopriL (PRINIVIL,ZESTRIL) 20 MG tablet; Take 1 tablet (20 mg total) by mouth once daily.  Dispense: 30 tablet; Refill: 0  -     montelukast (SINGULAIR) 10 mg tablet; Take 1 tablet (10 mg total) by mouth every evening.  Dispense: 30 tablet; Refill: 0  -     omeprazole  (PRILOSEC) 40 MG capsule; Take 1 capsule (40 mg total) by mouth once daily.  Dispense: 90 capsule; Refill: 3    Anxiety  Comments:  xanax prn  Orders:  -     ALPRAZolam (XANAX) 1 MG tablet; Take 1 tablet (1 mg total) by mouth 2 (two) times daily as needed for Anxiety.  Dispense: 180 tablet; Refill: 0    Sjogren's syndrome with keratoconjunctivitis sicca  Comments:  norco    Insomnia, unspecified type  Comments:  ambien    Gastroesophageal reflux disease, unspecified whether esophagitis present  Comments:  omeprazole    Other orders  -     methocarbamoL (ROBAXIN) 500 MG Tab; Take 1 tablet (500 mg total) by mouth 3 (three) times daily.  Dispense: 270 tablet; Refill: 1  -     gabapentin (NEURONTIN) 300 MG capsule; Take 1 capsule (300 mg total) by mouth 3 (three) times daily.  Dispense: 270 capsule; Refill: 1  -     Discontinue: amLODIPine (NORVASC) 5 MG tablet; Take 1 tablet (5 mg total) by mouth 2 (two) times daily.  Dispense: 180 tablet; Refill: 1  -     zolpidem (AMBIEN) 10 mg Tab; 1 tablet at bedtime  Dispense: 90 tablet; Refill: 1  -     albuterol (VENTOLIN HFA) 90 mcg/actuation inhaler; Inhale 2 puffs into the lungs every 6 (six) hours as needed for Wheezing. Rescue  Dispense: 18 g; Refill: 0      Follow up in about 6 months (around 5/2/2024), or if symptoms worsen or fail to improve, for medication management.        11/21/2023 Trinity Alonzo

## 2023-11-02 NOTE — TELEPHONE ENCOUNTER
----- Message from Erin Alvarez sent at 11/2/2023  3:24 PM CDT -----  Dameon from [facility] pharmacy.   Clarification on her Amlodipine. The patience did not realize that Trinity increased her Amlodipine. If She gets the 10 mg it's free and she can take 1 a day.  .  Pharmacy # [phone] GH

## 2023-11-07 ENCOUNTER — TELEPHONE (OUTPATIENT)
Dept: FAMILY MEDICINE | Facility: CLINIC | Age: 58
End: 2023-11-07

## 2023-11-07 NOTE — TELEPHONE ENCOUNTER
----- Message from Jenifer Vance sent at 11/7/2023  2:09 PM CST -----  Pt was seen on Thursday and was given an rx for adderall and the pharmacy does not have 20. They now have 30 and would like that called in   [facility]  [phone]

## 2023-11-09 ENCOUNTER — TELEPHONE (OUTPATIENT)
Dept: FAMILY MEDICINE | Facility: CLINIC | Age: 58
End: 2023-11-09

## 2023-11-09 RX ORDER — DEXTROAMPHETAMINE SACCHARATE, AMPHETAMINE ASPARTATE, DEXTROAMPHETAMINE SULFATE AND AMPHETAMINE SULFATE 7.5; 7.5; 7.5; 7.5 MG/1; MG/1; MG/1; MG/1
1 TABLET ORAL DAILY
Qty: 30 TABLET | Refills: 0 | Status: SHIPPED | OUTPATIENT
Start: 2023-11-09 | End: 2024-01-09 | Stop reason: SDUPTHER

## 2023-11-09 NOTE — TELEPHONE ENCOUNTER
----- Message from Erin Alvarez sent at 11/9/2023  9:07 AM CST -----  The patient saw Trinity about 1 week ago. She got a prescription for her Adderall 20 MG  She went to several pharmacy's  to get her  Adderall filled. They were out of the 20 mg. Can you print her 30 mg Adderall and she will bring in the  printed 20 mg prescription she has. They have that is stock at [facility]. Please call when this is ready. Can you amelia in her Tramadol   and Hydrocodone.  [facility] on [facility].  Pt's # [phone] GH

## 2023-11-09 NOTE — TELEPHONE ENCOUNTER
Printed the 30mg x 1 month. Will return to 20mg next month     [id number]     [employer]

## 2023-11-13 ENCOUNTER — TELEPHONE (OUTPATIENT)
Dept: FAMILY MEDICINE | Facility: CLINIC | Age: 58
End: 2023-11-13

## 2023-11-13 NOTE — TELEPHONE ENCOUNTER
----- Message from Tj Pleitez sent at 11/13/2023  3:46 PM CST -----  Pt dropped off prescription because she was given another one for a different dosage.   [phone]

## 2023-11-21 ENCOUNTER — PATIENT MESSAGE (OUTPATIENT)
Dept: FAMILY MEDICINE | Facility: CLINIC | Age: 58
End: 2023-11-21

## 2023-12-12 ENCOUNTER — TELEPHONE (OUTPATIENT)
Dept: FAMILY MEDICINE | Facility: CLINIC | Age: 58
End: 2023-12-12

## 2023-12-12 RX ORDER — AZITHROMYCIN 250 MG/1
TABLET, FILM COATED ORAL
Qty: 6 TABLET | Refills: 0 | Status: SHIPPED | OUTPATIENT
Start: 2023-12-12 | End: 2023-12-17

## 2023-12-12 NOTE — TELEPHONE ENCOUNTER
----- Message from Jenifer Vance sent at 12/12/2023 10:16 AM CST -----  Pt has a sinus infection and would like something called in. It has been a month and it keeps getting better and going away. It is on one side of her face. Swelling, pressure, ear, down neck.   [facility]   [phone]

## 2023-12-29 DIAGNOSIS — Z79.899 HIGH RISK MEDICATION USE: ICD-10-CM

## 2023-12-29 DIAGNOSIS — M79.7 FIBROMYALGIA: ICD-10-CM

## 2023-12-29 DIAGNOSIS — I10 HYPERTENSION, UNSPECIFIED TYPE: ICD-10-CM

## 2023-12-29 RX ORDER — MONTELUKAST SODIUM 10 MG/1
10 TABLET ORAL NIGHTLY
Qty: 30 TABLET | Refills: 0 | Status: SHIPPED | OUTPATIENT
Start: 2023-12-29 | End: 2024-01-26 | Stop reason: SDUPTHER

## 2023-12-29 RX ORDER — LISINOPRIL 20 MG/1
20 TABLET ORAL DAILY
Qty: 30 TABLET | Refills: 0 | Status: SHIPPED | OUTPATIENT
Start: 2023-12-29 | End: 2024-01-26 | Stop reason: SDUPTHER

## 2023-12-29 NOTE — TELEPHONE ENCOUNTER
----- Message from Key Baker sent at 12/29/2023 10:29 AM CST -----  Pt needs a refill for montelukast and lisinopril send to [facility] in [place]. Send one month at a time.  [phone]

## 2024-01-09 NOTE — TELEPHONE ENCOUNTER
----- Message from Jenifer Vance sent at 1/9/2024  3:22 PM CST -----  Vm- 2:52- pt needs refill on adderall   [phone]

## 2024-01-09 NOTE — TELEPHONE ENCOUNTER
Last OV 11/2/2023  No upcoming OV.  UDS not on file    Per  last dispensed 11/14/2023     Rx order set up.

## 2024-01-10 RX ORDER — DEXTROAMPHETAMINE SACCHARATE, AMPHETAMINE ASPARTATE, DEXTROAMPHETAMINE SULFATE AND AMPHETAMINE SULFATE 7.5; 7.5; 7.5; 7.5 MG/1; MG/1; MG/1; MG/1
1 TABLET ORAL DAILY
Qty: 30 TABLET | Refills: 0 | Status: SHIPPED | OUTPATIENT
Start: 2024-01-10 | End: 2024-01-17 | Stop reason: SDUPTHER

## 2024-01-10 RX ORDER — DEXTROAMPHETAMINE SACCHARATE, AMPHETAMINE ASPARTATE, DEXTROAMPHETAMINE SULFATE AND AMPHETAMINE SULFATE 7.5; 7.5; 7.5; 7.5 MG/1; MG/1; MG/1; MG/1
1 TABLET ORAL DAILY
Qty: 30 TABLET | Refills: 0 | Status: SHIPPED | OUTPATIENT
Start: 2024-01-10 | End: 2024-01-10 | Stop reason: SDUPTHER

## 2024-01-17 NOTE — TELEPHONE ENCOUNTER
----- Message from Jenifer Vance sent at 1/17/2024 10:23 AM CST -----  Pt pharmacy is out of stock and she would like to  an paper rx   [phone]

## 2024-01-18 RX ORDER — DEXTROAMPHETAMINE SACCHARATE, AMPHETAMINE ASPARTATE, DEXTROAMPHETAMINE SULFATE AND AMPHETAMINE SULFATE 7.5; 7.5; 7.5; 7.5 MG/1; MG/1; MG/1; MG/1
1 TABLET ORAL DAILY
Qty: 30 TABLET | Refills: 0 | Status: SHIPPED | OUTPATIENT
Start: 2024-01-18

## 2024-01-18 NOTE — TELEPHONE ENCOUNTER
Spoke with pt and informed her that her prescription has been printed and is ready for  at the front office. She voiced understanding.

## 2024-01-26 DIAGNOSIS — I10 HYPERTENSION, UNSPECIFIED TYPE: ICD-10-CM

## 2024-01-26 DIAGNOSIS — Z79.899 HIGH RISK MEDICATION USE: ICD-10-CM

## 2024-01-26 DIAGNOSIS — M79.7 FIBROMYALGIA: ICD-10-CM

## 2024-01-26 RX ORDER — MONTELUKAST SODIUM 10 MG/1
10 TABLET ORAL NIGHTLY
Qty: 30 TABLET | Refills: 5 | Status: SHIPPED | OUTPATIENT
Start: 2024-01-26

## 2024-01-26 RX ORDER — LISINOPRIL 20 MG/1
20 TABLET ORAL DAILY
Qty: 30 TABLET | Refills: 5 | Status: SHIPPED | OUTPATIENT
Start: 2024-01-26

## 2024-01-26 RX ORDER — AMLODIPINE BESYLATE 10 MG/1
5 TABLET ORAL 2 TIMES DAILY
Qty: 30 TABLET | Refills: 5 | Status: SHIPPED | OUTPATIENT
Start: 2024-01-26 | End: 2025-01-25

## 2024-01-26 NOTE — TELEPHONE ENCOUNTER
----- Message from Jenifer Vance sent at 1/26/2024  8:19 AM CST -----  Pt needs refill on montelukast, lisinopril, amlodipine. Please put refills on so she does not have to call every month   [facility]   [phone]

## 2024-03-12 ENCOUNTER — PATIENT MESSAGE (OUTPATIENT)
Dept: ADMINISTRATIVE | Facility: HOSPITAL | Age: 59
End: 2024-03-12
Payer: COMMERCIAL

## 2024-07-22 DIAGNOSIS — M79.7 FIBROMYALGIA: ICD-10-CM

## 2024-07-22 DIAGNOSIS — Z79.899 HIGH RISK MEDICATION USE: ICD-10-CM

## 2024-07-22 DIAGNOSIS — I10 HYPERTENSION, UNSPECIFIED TYPE: ICD-10-CM

## 2024-07-22 NOTE — TELEPHONE ENCOUNTER
Angie Jean Staff  Caller: Unspecified (Today,  3:44 PM)  Pt is returning the office call. Pt #[phone]

## 2024-07-22 NOTE — TELEPHONE ENCOUNTER
----- Message from Jenifer Vance sent at 7/22/2024  2:00 PM CDT -----  Vm- 1:09-pt needs an appt for refills on medication   [phone]

## 2024-07-23 NOTE — TELEPHONE ENCOUNTER
Jenifer Vance Staff  Caller: Unspecified (Today,  6:50 AM)  Vm- 7/22-5:17 pm- pt is calling carlton back and wants to to make an appt for refills. She is going to be out of lisinopril and singulair  [facility]  [phone]

## 2024-07-24 RX ORDER — MONTELUKAST SODIUM 10 MG/1
10 TABLET ORAL NIGHTLY
Qty: 30 TABLET | Refills: 5 | Status: SHIPPED | OUTPATIENT
Start: 2024-07-24

## 2024-07-24 RX ORDER — LISINOPRIL 20 MG/1
20 TABLET ORAL DAILY
Qty: 30 TABLET | Refills: 5 | Status: SHIPPED | OUTPATIENT
Start: 2024-07-24

## 2024-07-25 ENCOUNTER — TELEPHONE (OUTPATIENT)
Dept: FAMILY MEDICINE | Facility: CLINIC | Age: 59
End: 2024-07-25
Payer: COMMERCIAL

## 2024-07-25 RX ORDER — NIRMATRELVIR AND RITONAVIR 300-100 MG
KIT ORAL
Qty: 30 TABLET | Refills: 0 | Status: SHIPPED | OUTPATIENT
Start: 2024-07-25 | End: 2024-07-30

## 2024-07-25 NOTE — TELEPHONE ENCOUNTER
Spoke to pt and she stated she is tested positive for covid this morning, symptoms started on Tuesday with a sore throat. And Wednesday she felt terrible. Has cough, congestion, pressure in her face. Sore throat. OCT, allegra , Flonase and guaifenesin. Pt stated she feels like her head is going to explode.

## 2024-07-25 NOTE — TELEPHONE ENCOUNTER
----- Message from Jenifer Vance sent at 7/25/2024 10:30 AM CDT -----  Pt is not feeling well and tested positive for covid at home test today. Would like something called in   [facility]   [phone]

## 2024-07-25 NOTE — TELEPHONE ENCOUNTER
LMOR for pt letting her know Trinity sent paxlovid and to treat with over the counter medications. Ands to call back if she has any questions

## 2024-07-30 NOTE — TELEPHONE ENCOUNTER
----- Message from Tj Pleitez sent at 7/30/2024  2:22 PM CDT -----  Pt has an upcoming appt 08/14 and needs refills on gabapentin and methocarbamol before her appt and send to [id number] - [place], [place] - [address]. Also, she would like to know what is the protocol is for when you test positive for covid to return back to work.  [phone]

## 2024-07-30 NOTE — TELEPHONE ENCOUNTER
Spoke to pt and let her know fever free with out medications for 24 hours then wear a mask for 5 days

## 2024-07-31 RX ORDER — METHOCARBAMOL 500 MG/1
500 TABLET, FILM COATED ORAL 3 TIMES DAILY
Qty: 270 TABLET | Refills: 1 | Status: SHIPPED | OUTPATIENT
Start: 2024-07-31

## 2024-07-31 RX ORDER — GABAPENTIN 300 MG/1
300 CAPSULE ORAL 3 TIMES DAILY
Qty: 270 CAPSULE | Refills: 1 | Status: SHIPPED | OUTPATIENT
Start: 2024-07-31 | End: 2024-08-02 | Stop reason: SDUPTHER

## 2024-08-01 ENCOUNTER — TELEPHONE (OUTPATIENT)
Dept: FAMILY MEDICINE | Facility: CLINIC | Age: 59
End: 2024-08-01
Payer: COMMERCIAL

## 2024-08-01 NOTE — TELEPHONE ENCOUNTER
Left detailed voice message that patient is due for pre visit fasting labs prior to upcoming appointment. Labs placed to [facility].

## 2024-08-02 RX ORDER — GABAPENTIN 300 MG/1
300 CAPSULE ORAL 3 TIMES DAILY
Qty: 270 CAPSULE | Refills: 1 | Status: SHIPPED | OUTPATIENT
Start: 2024-08-02

## 2024-08-02 NOTE — TELEPHONE ENCOUNTER
----- Message from Jenifer Vance sent at 8/2/2024  8:03 AM CDT -----  Pt is calling about her gabapentin and it is not at the pharmacy   [facility]   [phone]

## 2024-08-13 ENCOUNTER — TELEPHONE (OUTPATIENT)
Dept: FAMILY MEDICINE | Facility: CLINIC | Age: 59
End: 2024-08-13
Payer: COMMERCIAL

## 2024-08-13 NOTE — TELEPHONE ENCOUNTER
----- Message from Angie Jean sent at 8/13/2024  8:06 AM CDT -----  Pt needs to reschedule her 8/14 appt. Pt #[phone]

## 2024-09-03 ENCOUNTER — OFFICE VISIT (OUTPATIENT)
Dept: FAMILY MEDICINE | Facility: CLINIC | Age: 59
End: 2024-09-03

## 2024-09-03 VITALS
BODY MASS INDEX: 20.43 KG/M2 | DIASTOLIC BLOOD PRESSURE: 68 MMHG | HEART RATE: 94 BPM | OXYGEN SATURATION: 100 % | SYSTOLIC BLOOD PRESSURE: 132 MMHG | WEIGHT: 111 LBS | HEIGHT: 62 IN

## 2024-09-03 DIAGNOSIS — F41.9 ANXIETY: ICD-10-CM

## 2024-09-03 DIAGNOSIS — J30.1 ALLERGIC RHINITIS DUE TO POLLEN, UNSPECIFIED SEASONALITY: ICD-10-CM

## 2024-09-03 DIAGNOSIS — M35.01 SJOGREN'S SYNDROME WITH KERATOCONJUNCTIVITIS SICCA: ICD-10-CM

## 2024-09-03 DIAGNOSIS — I10 HYPERTENSION, UNSPECIFIED TYPE: ICD-10-CM

## 2024-09-03 DIAGNOSIS — M79.7 FIBROMYALGIA: ICD-10-CM

## 2024-09-03 DIAGNOSIS — F90.0 ADHD, PREDOMINANTLY INATTENTIVE TYPE: Primary | ICD-10-CM

## 2024-09-03 DIAGNOSIS — Z79.899 HIGH RISK MEDICATION USE: ICD-10-CM

## 2024-09-03 DIAGNOSIS — K21.9 GASTROESOPHAGEAL REFLUX DISEASE, UNSPECIFIED WHETHER ESOPHAGITIS PRESENT: ICD-10-CM

## 2024-09-03 PROCEDURE — 99213 OFFICE O/P EST LOW 20 MIN: CPT | Mod: S$GLB,,, | Performed by: NURSE PRACTITIONER

## 2024-09-03 RX ORDER — MONTELUKAST SODIUM 10 MG/1
10 TABLET ORAL NIGHTLY
Qty: 30 TABLET | Refills: 5 | Status: SHIPPED | OUTPATIENT
Start: 2024-09-03

## 2024-09-03 RX ORDER — DEXTROAMPHETAMINE SACCHARATE, AMPHETAMINE ASPARTATE, DEXTROAMPHETAMINE SULFATE AND AMPHETAMINE SULFATE 7.5; 7.5; 7.5; 7.5 MG/1; MG/1; MG/1; MG/1
1 TABLET ORAL DAILY
Qty: 30 TABLET | Refills: 0 | Status: SHIPPED | OUTPATIENT
Start: 2024-09-03

## 2024-09-03 RX ORDER — LISINOPRIL 20 MG/1
20 TABLET ORAL DAILY
Qty: 30 TABLET | Refills: 5 | Status: SHIPPED | OUTPATIENT
Start: 2024-09-03

## 2024-09-03 RX ORDER — ZOLPIDEM TARTRATE 10 MG/1
TABLET ORAL
Qty: 90 TABLET | Refills: 1 | Status: SHIPPED | OUTPATIENT
Start: 2024-09-03

## 2024-09-03 RX ORDER — AMLODIPINE BESYLATE 10 MG/1
5 TABLET ORAL 2 TIMES DAILY
Qty: 30 TABLET | Refills: 5 | Status: SHIPPED | OUTPATIENT
Start: 2024-09-03 | End: 2025-09-03

## 2024-09-03 RX ORDER — TRAMADOL HYDROCHLORIDE 50 MG/1
50 TABLET ORAL EVERY 8 HOURS PRN
Qty: 30 TABLET | Refills: 0 | Status: CANCELLED | OUTPATIENT
Start: 2024-09-03

## 2024-09-03 RX ORDER — HYDROCODONE BITARTRATE AND ACETAMINOPHEN 5; 325 MG/1; MG/1
1 TABLET ORAL EVERY 12 HOURS PRN
Qty: 60 TABLET | Refills: 0 | Status: SHIPPED | OUTPATIENT
Start: 2024-09-03

## 2024-09-03 RX ORDER — OMEPRAZOLE 40 MG/1
40 CAPSULE, DELAYED RELEASE ORAL DAILY
Qty: 90 CAPSULE | Refills: 3 | Status: SHIPPED | OUTPATIENT
Start: 2024-09-03 | End: 2025-09-03

## 2024-09-03 NOTE — TELEPHONE ENCOUNTER
----- Message from Batool Mitchell LPN sent at 9/3/2024 12:15 PM CDT -----  Regarding: FW: refills    ----- Message -----  From: Yolanda Coughlin MA  Sent: 9/3/2024  12:12 PM CDT  To: Rasta Fitch Staff  Subject: refills                                          Pt is here in office to see Trinity Alonzo and is requesting refills on Norco -uds pended

## 2024-09-04 ENCOUNTER — PATIENT OUTREACH (OUTPATIENT)
Dept: ADMINISTRATIVE | Facility: HOSPITAL | Age: 59
End: 2024-09-04
Payer: COMMERCIAL

## 2024-09-04 NOTE — PROGRESS NOTES
Population Health Chart Review & Patient Outreach Details      Additional [facility] Health Notes:               Updates Requested / Reviewed:      Updated Care Coordination Note, Care Everywhere, , External Sources: [facility], [facility], DIS, and Prov[facility], and Immunizations Reconciliation Completed or Queried: [facility] Topics Overdue:      VBHM Score: 2     Colon Cancer Screening  Mammogram                       Health Maintenance Topic(s) Outreach Outcomes & Actions Taken:    Breast Cancer Screening - Outreach Outcomes & Actions Taken  : Reminder pt portal message sent    Cervical Cancer Screening - Outreach Outcomes & Actions Taken  : External Records Uploaded & Care Team Updated if Applicable    Colorectal Cancer Screening - Outreach Outcomes & Actions Taken  : Reminder pt portal message sent.

## 2024-09-16 NOTE — PROGRESS NOTES
"  SUBJECTIVE:    Patient ID: Nataly Traore is a 58 y.o. female.    Chief Complaint: Follow-up (Brought bottles// refills needed// pt states she had covid 07/24 says she is still feeling very fatigue and joint pain// also have a spot on top of of head going on for 1 month //states she's still having right earache //also she's been having off and on "heart flutters" going on for a very long time but recently 2 weeks ago getting worse//pt refused Colorectal Cancer Screening and Mammogram due to being self pay //Cervical Cancer Screening  was done 08/04/22//last taken xanax was last night -loida asencio)    58 year old female presents for check up. Treated for sjogrens, htn, insomnia, allergic rhinitis, adhd and chronic pain. Needs refills on medications.had some labs done nirali uriostegui. Has chronic allergies. Sleeps ok with ambien. Takes adderall prn. Denies side effects. Bp in office is well controlled. Reports that had covid end of July. Since occasionally has palpitations. Resolves quickly. Notices no pattern. Seems to be lessoning last few days.     Follow-up  Pertinent negatives include no abdominal pain, arthralgias, chest pain, chills, coughing, fever, headaches, nausea, rash, sore throat, vomiting or weakness.       No visits with results within 6 Month(s) from this visit.   Latest known visit with results is:   Lab Visit on 01/18/2019   Component Date Value Ref Range Status    CRP 01/18/2019 2.1  0.0 - 8.2 mg/L Final    Complement (C-3) 01/18/2019 121  50 - 180 mg/dL Final    Complement (C-4) 01/18/2019 14  11 - 44 mg/dL Final    Immunofix Interp. 01/18/2019 SEE COMMENT   Final    Protein, Serum 01/18/2019 7.0  6.0 - 8.4 g/dL Final    Albumin 01/18/2019 3.96  3.35 - 5.55 g/dL Final    Alpha-1 01/18/2019 0.32  0.17 - 0.41 g/dL Final    Alpha-2 01/18/2019 0.76  0.43 - 0.99 g/dL Final    Beta 01/18/2019 0.74  0.50 - 1.10 g/dL Final    Gamma 01/18/2019 1.23  0.67 - 1.58 g/dL Final    Cryoglobulin, Qual 01/18/2019 " Absent  Absent Final    RNA Polymerase III Antibodies, IgG* 01/18/2019 <10.0  <20.0 (Negative) U Final    Pathologist Interpretation SPE 01/18/2019 REVIEWED   Final    Pathologist Interpretation ELLEN 01/18/2019 REVIEWED   Final       Past Medical History:   Diagnosis Date    Arthritis     Hypertension      Social History     Socioeconomic History    Marital status:    Tobacco Use    Smoking status: Never    Smokeless tobacco: Never   Substance and Sexual Activity    Alcohol use: No    Drug use: No    Sexual activity: Yes     Partners: Male     Social Determinants of Health     Financial Resource Strain: Medium Risk (8/24/2022)    Received from [facility]    Overall Financial Resource Strain (CARDIA)     Difficulty of Paying Living Expenses: Somewhat hard   Transportation Needs: No Transportation Needs (8/6/2024)    Received from [facility] ST[facility]    PRAPARE - Transportation     Lack of Transportation (Medical): No     Lack of Transportation (Non-Medical): No   Physical Activity: Inactive (8/24/2022)    Received from [facility]    Exercise Vital Sign     Days of Exercise per Week: 0 days     Minutes of Exercise per Session: 0 min   Stress: No Stress Concern Present (8/24/2022)    Received from [facility]    [employer] - Occupational Stress Questionnaire     Feeling of Stress : Not at all   Housing Stability: Unknown (8/24/2022)    Received from [facility]    Housing Stability Vital Sign     Unable to Pay for Housing in the Last Year: No     In the last 12 months, was there a time when you did not have a steady place to sleep or slept in a shelter (including now)?: No     History reviewed. No pertinent surgical history.  Family History   Problem Relation Name Age of Onset    Hypertension Mother      Diabetes Mother      Arthritis Mother      Kidney cancer Father      Hypertension Father      Heart disease Father         Tests to Keep  You Healthy    Mammogram: ORDERED BUT NOT SCHEDULED  Colon Cancer Screening: DUE  Cervical Cancer Screening: Met on 8/4/2022  Last Blood Pressure <= 139/89 (9/3/2024): Yes      Review of patient's allergies indicates:   Allergen Reactions    Sulfa (sulfonamide antibiotics)        Current Outpatient Medications:     albuterol (VENTOLIN HFA) 90 mcg/actuation inhaler, Inhale 2 puffs into the lungs every 6 (six) hours as needed for Wheezing. Rescue, Disp: 18 g, Rfl: 0    ALPRAZolam (XANAX) 1 MG tablet, Take 1 tablet (1 mg total) by mouth 2 (two) times daily as needed for Anxiety., Disp: 180 tablet, Rfl: 0    fexofenadine (ALLEGRA) 180 MG tablet, Take 180 mg by mouth once daily., Disp: , Rfl:     fluticasone propionate (FLONASE) 50 mcg/actuation nasal spray, 1 spray (50 mcg total) by Each Nostril route once daily., Disp: 16 g, Rfl: 3    gabapentin (NEURONTIN) 300 MG capsule, Take 1 capsule (300 mg total) by mouth 3 (three) times daily., Disp: 270 capsule, Rfl: 1    melatonin 5 mg Cap, Take by mouth., Disp: , Rfl:     methocarbamoL (ROBAXIN) 500 MG Tab, Take 1 tablet (500 mg total) by mouth 3 (three) times daily., Disp: 270 tablet, Rfl: 1    multivitamin capsule, Take 1 capsule by mouth., Disp: , Rfl:     amLODIPine (NORVASC) 10 MG tablet, Take 0.5 tablets (5 mg total) by mouth 2 (two) times daily., Disp: 30 tablet, Rfl: 5    dextroamphetamine-amphetamine (ADDERALL) 30 mg Tab, Take 1 tablet (30 mg total) by mouth Daily., Disp: 30 tablet, Rfl: 0    HYDROcodone-acetaminophen (NORCO) 5-325 mg per tablet, Take 1 tablet by mouth every 12 (twelve) hours as needed for Pain., Disp: 60 tablet, Rfl: 0    lisinopriL (PRINIVIL,ZESTRIL) 20 MG tablet, Take 1 tablet (20 mg total) by mouth once daily., Disp: 30 tablet, Rfl: 5    montelukast (SINGULAIR) 10 mg tablet, Take 1 tablet (10 mg total) by mouth every evening., Disp: 30 tablet, Rfl: 5    omeprazole (PRILOSEC) 40 MG capsule, Take 1 capsule (40 mg total) by mouth once daily., Disp:  "90 capsule, Rfl: 3    zolpidem (AMBIEN) 10 mg Tab, 1 tablet at bedtime, Disp: 90 tablet, Rfl: 1    Review of Systems   Constitutional:  Negative for chills, fever and unexpected weight change.   HENT:  Positive for rhinorrhea. Negative for ear pain and sore throat.    Eyes:  Negative for pain and visual disturbance.   Respiratory:  Negative for cough and shortness of breath.    Cardiovascular:  Positive for palpitations. Negative for chest pain and leg swelling.   Gastrointestinal:  Negative for abdominal pain, diarrhea, nausea and vomiting.   Genitourinary:  Negative for difficulty urinating, hematuria and vaginal bleeding.   Musculoskeletal:  Negative for arthralgias.   Skin:  Negative for rash.   Neurological:  Negative for dizziness, weakness and headaches.   Psychiatric/Behavioral:  Negative for agitation and sleep disturbance. The patient is not nervous/anxious.           Objective:      Vitals:    09/03/24 1208   BP: 132/68   Pulse: 94   SpO2: 100%   Weight: 50.3 kg (111 lb)   Height: 5' 2" (1.575 m)     Physical Exam  Vitals and nursing note reviewed.   Constitutional:       General: She is not in acute distress.     Appearance: Normal appearance. She is well-developed.   HENT:      Head: Normocephalic.      Right Ear: External ear normal.      Left Ear: External ear normal.      Nose: Congestion present.   Neck:      Vascular: No JVD.   Cardiovascular:      Rate and Rhythm: Normal rate and regular rhythm.      Heart sounds: No murmur heard.  Pulmonary:      Effort: Pulmonary effort is normal.      Breath sounds: Normal breath sounds.   Abdominal:      General: Bowel sounds are normal.      Palpations: Abdomen is soft.   Musculoskeletal:         General: No deformity.      Right hand: Decreased range of motion.      Left hand: Decreased range of motion.      Cervical back: Normal range of motion and neck supple.   Lymphadenopathy:      Cervical: No cervical adenopathy.   Skin:     General: Skin is warm and " dry.      Findings: No rash.   Neurological:      Mental Status: She is alert and oriented to person, place, and time.      Gait: Gait normal.   Psychiatric:         Speech: Speech normal.         Behavior: Behavior normal.           Assessment:       1. ADHD, predominantly inattentive type    2. High risk medication use    3. Fibromyalgia    4. Hypertension, unspecified type    5. Sjogren's syndrome with keratoconjunctivitis sicca    6. Anxiety    7. Gastroesophageal reflux disease, unspecified whether esophagitis present    8. Allergic rhinitis due to pollen, unspecified seasonality         Plan:       ADHD, predominantly inattentive type  Comments:  hold adderall if palpitations    High risk medication use  -     omeprazole (PRILOSEC) 40 MG capsule; Take 1 capsule (40 mg total) by mouth once daily.  Dispense: 90 capsule; Refill: 3  -     montelukast (SINGULAIR) 10 mg tablet; Take 1 tablet (10 mg total) by mouth every evening.  Dispense: 30 tablet; Refill: 5  -     lisinopriL (PRINIVIL,ZESTRIL) 20 MG tablet; Take 1 tablet (20 mg total) by mouth once daily.  Dispense: 30 tablet; Refill: 5    Fibromyalgia  -     omeprazole (PRILOSEC) 40 MG capsule; Take 1 capsule (40 mg total) by mouth once daily.  Dispense: 90 capsule; Refill: 3  -     montelukast (SINGULAIR) 10 mg tablet; Take 1 tablet (10 mg total) by mouth every evening.  Dispense: 30 tablet; Refill: 5  -     lisinopriL (PRINIVIL,ZESTRIL) 20 MG tablet; Take 1 tablet (20 mg total) by mouth once daily.  Dispense: 30 tablet; Refill: 5    Hypertension, unspecified type  Comments:  bp in office controlled  Orders:  -     lisinopriL (PRINIVIL,ZESTRIL) 20 MG tablet; Take 1 tablet (20 mg total) by mouth once daily.  Dispense: 30 tablet; Refill: 5    Sjogren's syndrome with keratoconjunctivitis sicca  Comments:  agrees to see rheumatology    Anxiety  Comments:  xanax prn    Gastroesophageal reflux disease, unspecified whether esophagitis  present  Comments:  prilosec    Allergic rhinitis due to pollen, unspecified seasonality  Comments:  singulair    Other orders  -     zolpidem (AMBIEN) 10 mg Tab; 1 tablet at bedtime  Dispense: 90 tablet; Refill: 1  -     amLODIPine (NORVASC) 10 MG tablet; Take 0.5 tablets (5 mg total) by mouth 2 (two) times daily.  Dispense: 30 tablet; Refill: 5  -     dextroamphetamine-amphetamine (ADDERALL) 30 mg Tab; Take 1 tablet (30 mg total) by mouth Daily.  Dispense: 30 tablet; Refill: 0      Follow up in about 6 months (around 3/3/2025), or if symptoms worsen or fail to improve, for medication management.        9/16/2024 [facility] Alon[facility]

## 2024-10-06 ENCOUNTER — TELEPHONE (OUTPATIENT)
Dept: FAMILY MEDICINE | Facility: CLINIC | Age: 59
End: 2024-10-06
Payer: COMMERCIAL

## 2024-10-07 NOTE — TELEPHONE ENCOUNTER
Last mammogram 2022. Does she plan to do at [facility] or can we find out cash price for October? Sometimes offer discount during the month of October.

## 2024-10-10 ENCOUNTER — TELEPHONE (OUTPATIENT)
Dept: FAMILY MEDICINE | Facility: CLINIC | Age: 59
End: 2024-10-10
Payer: COMMERCIAL

## 2024-10-10 DIAGNOSIS — Z12.31 BREAST CANCER SCREENING BY MAMMOGRAM: Primary | ICD-10-CM

## 2024-10-10 NOTE — TELEPHONE ENCOUNTER
----- Message from Kary sent at 10/10/2024  2:33 PM CDT -----  Returning a phone call.   [phone]

## 2024-10-10 NOTE — TELEPHONE ENCOUNTER
Spoke to pt and she is okay with Mammogram wants sent to [facility]   Mammo sent

## 2024-10-11 ENCOUNTER — TELEPHONE (OUTPATIENT)
Dept: FAMILY MEDICINE | Facility: CLINIC | Age: 59
End: 2024-10-11
Payer: COMMERCIAL

## 2024-10-11 DIAGNOSIS — Z12.31 BREAST CANCER SCREENING BY MAMMOGRAM: Primary | ICD-10-CM

## 2024-10-11 NOTE — TELEPHONE ENCOUNTER
Sent Mammogram order to [facility] per patient request was sent back . [facility] stated it has to be signed by provider not nurse. Please signa and send back so I can fax to [facility]

## 2024-11-19 DIAGNOSIS — F90.0 ADHD, PREDOMINANTLY INATTENTIVE TYPE: Primary | ICD-10-CM

## 2024-11-19 DIAGNOSIS — F41.9 ANXIETY: ICD-10-CM

## 2024-11-19 DIAGNOSIS — M79.7 FIBROMYALGIA: ICD-10-CM

## 2024-11-19 RX ORDER — TRAMADOL HYDROCHLORIDE 50 MG/1
50 TABLET ORAL EVERY 6 HOURS
Qty: 30 TABLET | Refills: 0 | Status: SHIPPED | OUTPATIENT
Start: 2024-11-19

## 2024-11-19 RX ORDER — DEXTROAMPHETAMINE SACCHARATE, AMPHETAMINE ASPARTATE, DEXTROAMPHETAMINE SULFATE AND AMPHETAMINE SULFATE 7.5; 7.5; 7.5; 7.5 MG/1; MG/1; MG/1; MG/1
1 TABLET ORAL DAILY
Qty: 30 TABLET | Refills: 0 | Status: SHIPPED | OUTPATIENT
Start: 2024-11-19

## 2024-11-19 RX ORDER — METHYLPREDNISOLONE 4 MG/1
TABLET ORAL
Qty: 21 EACH | Refills: 0 | Status: SHIPPED | OUTPATIENT
Start: 2024-11-19 | End: 2024-12-10

## 2024-11-19 RX ORDER — ALPRAZOLAM 1 MG/1
1 TABLET ORAL 2 TIMES DAILY PRN
Qty: 60 TABLET | Refills: 0 | Status: SHIPPED | OUTPATIENT
Start: 2024-11-19

## 2024-11-19 NOTE — TELEPHONE ENCOUNTER
----- Message from Jenifer sent at 11/19/2024  1:52 PM CST -----  Vm- 1:18-pt would like to talk to nurse about refills and has some questions   [phone]

## 2024-11-19 NOTE — TELEPHONE ENCOUNTER
Spoke with patient, states she needs refill on Adderall, Xanax, Tramadol (takes PRN) states she also has Hydrocodone that she takes PRN. States she feels more comfortable taking tramadol but takes the hydrocodone only when it's severe.     States she also thinks she may have a sinus infection, very bad pressure on one side of her face, draining from that one side. 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 She also has dizziness that comes and goes but feels like the sinus pressure has gotten worse since yesterday. Noticed the symptoms 2 days ago, has taken everything possible OTC with no relief. She is asking for a medrol dose pack for the fluid.

## 2024-11-26 ENCOUNTER — TELEPHONE (OUTPATIENT)
Dept: FAMILY MEDICINE | Facility: CLINIC | Age: 59
End: 2024-11-26
Payer: COMMERCIAL

## 2025-03-11 ENCOUNTER — OFFICE VISIT (OUTPATIENT)
Dept: FAMILY MEDICINE | Facility: CLINIC | Age: 60
End: 2025-03-11

## 2025-03-11 ENCOUNTER — TELEPHONE (OUTPATIENT)
Dept: FAMILY MEDICINE | Facility: CLINIC | Age: 60
End: 2025-03-11
Payer: COMMERCIAL

## 2025-03-11 VITALS
HEART RATE: 90 BPM | HEIGHT: 62 IN | BODY MASS INDEX: 21.23 KG/M2 | OXYGEN SATURATION: 99 % | SYSTOLIC BLOOD PRESSURE: 138 MMHG | WEIGHT: 115.38 LBS | DIASTOLIC BLOOD PRESSURE: 68 MMHG

## 2025-03-11 DIAGNOSIS — I10 HYPERTENSION, UNSPECIFIED TYPE: ICD-10-CM

## 2025-03-11 DIAGNOSIS — Z79.899 HIGH RISK MEDICATION USE: ICD-10-CM

## 2025-03-11 DIAGNOSIS — Z79.899 ENCOUNTER FOR LONG-TERM (CURRENT) USE OF MEDICATIONS: ICD-10-CM

## 2025-03-11 DIAGNOSIS — Z51.81 ENCOUNTER FOR THERAPEUTIC DRUG MONITORING: ICD-10-CM

## 2025-03-11 DIAGNOSIS — F41.9 ANXIETY: ICD-10-CM

## 2025-03-11 DIAGNOSIS — F90.0 ADHD, PREDOMINANTLY INATTENTIVE TYPE: Primary | ICD-10-CM

## 2025-03-11 DIAGNOSIS — M35.01 SJOGREN'S SYNDROME WITH KERATOCONJUNCTIVITIS SICCA: ICD-10-CM

## 2025-03-11 DIAGNOSIS — J30.1 ALLERGIC RHINITIS DUE TO POLLEN, UNSPECIFIED SEASONALITY: ICD-10-CM

## 2025-03-11 DIAGNOSIS — G47.00 INSOMNIA, UNSPECIFIED TYPE: ICD-10-CM

## 2025-03-11 DIAGNOSIS — G62.9 NEUROPATHY: ICD-10-CM

## 2025-03-11 DIAGNOSIS — K21.9 GASTROESOPHAGEAL REFLUX DISEASE, UNSPECIFIED WHETHER ESOPHAGITIS PRESENT: ICD-10-CM

## 2025-03-11 DIAGNOSIS — M79.7 FIBROMYALGIA: ICD-10-CM

## 2025-03-11 PROCEDURE — 99213 OFFICE O/P EST LOW 20 MIN: CPT | Mod: S$GLB,,, | Performed by: NURSE PRACTITIONER

## 2025-03-11 NOTE — TELEPHONE ENCOUNTER
----- Message from Key sent at 3/11/2025  3:57 PM CDT -----  Vm:341Pt's last mammogram was in 2022. She wanted to know can she wait until October or does she needs to do it soon.[phone]

## 2025-03-12 ENCOUNTER — PATIENT OUTREACH (OUTPATIENT)
Dept: ADMINISTRATIVE | Facility: HOSPITAL | Age: 60
End: 2025-03-12
Payer: COMMERCIAL

## 2025-03-12 NOTE — PROGRESS NOTES
Population Health Chart Review & Patient Outreach Details      Additional [facility] Health Notes:               Updates Requested / Reviewed:      Updated Care Coordination Note, Care Everywhere, , External Sources: DIS and Provation, and Immunizations Reconciliation Completed or Queried: [facility] Topics Overdue:      [facility] Score: 2     Colon Cancer Screening  Mammogram                       Health Maintenance Topic(s) Outreach Outcomes & Actions Taken:    Breast Cancer Screening - Outreach Outcomes & Actions Taken  : Reminder pt portal message sent.    Colorectal Cancer Screening - Outreach Outcomes & Actions Taken  : External Records Uploaded, Care Team Updated, & History Updated if Applicable and Reminder pt portal message sent.

## 2025-03-17 ENCOUNTER — TELEPHONE (OUTPATIENT)
Dept: FAMILY MEDICINE | Facility: CLINIC | Age: 60
End: 2025-03-17

## 2025-03-17 RX ORDER — ZOLPIDEM TARTRATE 10 MG/1
TABLET ORAL
Qty: 90 TABLET | Refills: 1 | Status: SHIPPED | OUTPATIENT
Start: 2025-03-17

## 2025-03-17 RX ORDER — DEXTROAMPHETAMINE SACCHARATE, AMPHETAMINE ASPARTATE, DEXTROAMPHETAMINE SULFATE AND AMPHETAMINE SULFATE 7.5; 7.5; 7.5; 7.5 MG/1; MG/1; MG/1; MG/1
1 TABLET ORAL DAILY
Qty: 30 TABLET | Refills: 0 | Status: SHIPPED | OUTPATIENT
Start: 2025-05-10

## 2025-03-17 RX ORDER — OMEPRAZOLE 40 MG/1
40 CAPSULE, DELAYED RELEASE ORAL DAILY
Qty: 90 CAPSULE | Refills: 3 | Status: SHIPPED | OUTPATIENT
Start: 2025-03-17 | End: 2026-03-17

## 2025-03-17 RX ORDER — GABAPENTIN 300 MG/1
300 CAPSULE ORAL 3 TIMES DAILY
Qty: 270 CAPSULE | Refills: 1 | Status: SHIPPED | OUTPATIENT
Start: 2025-03-17

## 2025-03-17 RX ORDER — ALPRAZOLAM 1 MG/1
1 TABLET ORAL 2 TIMES DAILY PRN
Qty: 60 TABLET | Refills: 2 | Status: SHIPPED | OUTPATIENT
Start: 2025-03-17

## 2025-03-17 RX ORDER — MONTELUKAST SODIUM 10 MG/1
10 TABLET ORAL NIGHTLY
Qty: 30 TABLET | Refills: 5 | Status: SHIPPED | OUTPATIENT
Start: 2025-03-17

## 2025-03-17 RX ORDER — TRAMADOL HYDROCHLORIDE 50 MG/1
50 TABLET ORAL EVERY 6 HOURS
Qty: 30 TABLET | Refills: 0 | Status: SHIPPED | OUTPATIENT
Start: 2025-03-17

## 2025-03-17 RX ORDER — METHOCARBAMOL 500 MG/1
500 TABLET, FILM COATED ORAL 3 TIMES DAILY
Qty: 270 TABLET | Refills: 1 | Status: SHIPPED | OUTPATIENT
Start: 2025-03-17

## 2025-03-17 RX ORDER — DEXTROAMPHETAMINE SACCHARATE, AMPHETAMINE ASPARTATE, DEXTROAMPHETAMINE SULFATE AND AMPHETAMINE SULFATE 7.5; 7.5; 7.5; 7.5 MG/1; MG/1; MG/1; MG/1
1 TABLET ORAL DAILY
Qty: 30 TABLET | Refills: 0 | Status: SHIPPED | OUTPATIENT
Start: 2025-04-10

## 2025-03-17 RX ORDER — LISINOPRIL 20 MG/1
20 TABLET ORAL DAILY
Qty: 30 TABLET | Refills: 5 | Status: SHIPPED | OUTPATIENT
Start: 2025-03-17

## 2025-03-17 RX ORDER — DEXTROAMPHETAMINE SACCHARATE, AMPHETAMINE ASPARTATE, DEXTROAMPHETAMINE SULFATE AND AMPHETAMINE SULFATE 7.5; 7.5; 7.5; 7.5 MG/1; MG/1; MG/1; MG/1
1 TABLET ORAL DAILY
Qty: 30 TABLET | Refills: 0 | Status: SHIPPED | OUTPATIENT
Start: 2025-03-17

## 2025-03-17 RX ORDER — AMLODIPINE BESYLATE 10 MG/1
5 TABLET ORAL 2 TIMES DAILY
Qty: 30 TABLET | Refills: 5 | Status: SHIPPED | OUTPATIENT
Start: 2025-03-17 | End: 2026-03-17

## 2025-03-17 NOTE — TELEPHONE ENCOUNTER
----- Message from Jenifer sent at 3/17/2025  9:58 AM CDT -----  - 9:41- pt was seen last week for refills and none have been called in  [phone]

## 2025-03-19 NOTE — PROGRESS NOTES
"  SUBJECTIVE:    Patient ID: Nataly Traore is a 59 y.o. female.    Chief Complaint: Follow-up (Bottles brought//Pt is here for a 6 month check and medication refills//Pt would like to discuss her sinuses.//Pt stated that she has not done her Cologuard kit yet//Pt will scheduled mammo later//JOSH )      History of Present Illness    CHIEF COMPLAINT:  59 year old female presents today for regular checkup    CURRENT SYMPTOMS:  She reports a raw throat sensation, slightly worse than baseline but mild in severity.    GASTROINTESTINAL:  She has a history of "nervous stomach". Her GI symptoms have improved since starting digestive enzymes before meals. She occasionally experiences post-prandial pain that typically resolves within minutes.    MEDICATIONS:  She takes Xanax and Amlodipine for blood pressure with 30-day supply.    FAMILY HISTORY:  Maternal great grandfather had colon cancer. Mother has recent history of multiple infections including H. pylori, C. difficile, and salmonella.    PREVENTIVE CARE:  Last labs including rheumatology panel was completed in August. She believes last mammogram was in 2022. Colonoscopy in 2019 was unremarkable with no specimens removed.      ROS:  General: -fever, -chills, -fatigue, -weight gain, -weight loss  Eyes: -vision changes, -redness, -discharge  ENT: -ear pain, -nasal congestion, -sore throat  Cardiovascular: -chest pain, -palpitations, -lower extremity edema  Respiratory: -cough, -shortness of breath  Gastrointestinal: -abdominal pain, -nausea, -vomiting, -diarrhea, -constipation, -blood in stool  Genitourinary: -dysuria, -hematuria, -frequency  Musculoskeletal: -joint pain, -muscle pain  Skin: -rash, -lesion  Neurological: -headache, -dizziness, -numbness, -tingling  Psychiatric: -anxiety, -depression, -sleep difficulty              No visits with results within 6 Month(s) from this visit.   Latest known visit with results is:   Patient Outreach on 09/04/2024   Component Date " "Value Ref Range Status    Hepatitis C Ab 03/01/2019 Negative   Final    PAP Recommendation External 08/04/2022 Pap in 3 years   Final    Pap 08/04/2022 Negative for intraephithelial lesion or malignancy  Negative for intraephithelial lesion or malignancy, Other Final       Past Medical History:   Diagnosis Date    Arthritis     Hypertension      History reviewed. No pertinent surgical history.  Family History   Problem Relation Name Age of Onset    Hypertension Mother      Diabetes Mother      Arthritis Mother      Kidney cancer Father      Hypertension Father      Heart disease Father         Tests to Keep You Healthy    Mammogram: ORDERED BUT NOT SCHEDULED  Colon Cancer Screening: DUE  Cervical Cancer Screening: Met on 8/4/2022  Last Blood Pressure <= 139/89 (3/11/2025): Yes      The CVD Risk score (TORSTEN'Agostino, et al., 2008) failed to calculate for the following reasons:    Cannot find a previous HDL lab    Cannot find a previous total cholesterol lab     Marital Status:   Alcohol History:  reports no history of alcohol use.  Tobacco History:  reports that she has never smoked. She has never used smokeless tobacco.  Drug History:  reports no history of drug use.    Health Maintenance Topics with due status: Not Due       Topic Last Completion Date    Lipid Panel 03/11/2021    Cervical Cancer Screening 08/04/2022    RSV Vaccine (Age 60+ and Pregnant patients) Not Due     Immunization History   Administered Date(s) Administered    COVID-19, MRNA, LN-S, PF (Pfizer) (Purple Cap) 12/14/2021       Review of patient's allergies indicates:   Allergen Reactions    Sulfa (sulfonamide antibiotics)      Current Medications[1]        Objective:      Vitals:    03/11/25 1004   BP: 138/68   Pulse: 90   SpO2: 99%   Weight: 52.3 kg (115 lb 6.4 oz)   Height: 5' 2" (1.575 m)       Physical Exam  Vitals and nursing note reviewed.   Constitutional:       General: She is not in acute distress.     Appearance: Normal appearance. " She is well-developed.   HENT:      Head: Normocephalic.      Right Ear: External ear normal.      Left Ear: External ear normal.      Nose: Congestion and rhinorrhea present.      Right Sinus: No maxillary sinus tenderness or frontal sinus tenderness.      Left Sinus: No maxillary sinus tenderness or frontal sinus tenderness.   Eyes:      Conjunctiva/sclera: Conjunctivae normal.      Pupils: Pupils are equal, round, and reactive to light.   Neck:      Vascular: No JVD.   Cardiovascular:      Rate and Rhythm: Normal rate and regular rhythm.      Heart sounds: No murmur heard.  Pulmonary:      Effort: Pulmonary effort is normal.      Breath sounds: Normal breath sounds.   Abdominal:      General: Bowel sounds are normal.      Palpations: Abdomen is soft.   Musculoskeletal:         General: No deformity. Normal range of motion.      Cervical back: Normal range of motion and neck supple.   Lymphadenopathy:      Cervical: No cervical adenopathy.   Skin:     General: Skin is warm and dry.      Findings: No rash.   Neurological:      Mental Status: She is alert and oriented to person, place, and time.      Gait: Gait normal.   Psychiatric:         Speech: Speech normal.         Behavior: Behavior normal.            Assessment:       1. ADHD, predominantly inattentive type    2. Fibromyalgia    3. Encounter for therapeutic drug monitoring    4. Encounter for long-term (current) use of medications    5. High risk medication use    6. Anxiety    7. Hypertension, unspecified type    8. Insomnia, unspecified type    9. Gastroesophageal reflux disease, unspecified whether esophagitis present    10. Allergic rhinitis due to pollen, unspecified seasonality    11. Sjogren's syndrome with keratoconjunctivitis sicca    12. Neuropathy           Assessment & Plan    - Reviewed patient's labs from August, including rheumatology panel  - Considered need for lipid panel, TSH, CBC, and CMP due to current medications  - Assessed  colonoscopy history; noted 2019 procedure with normal findings  - Evaluated abdominal discomfort, considering potential gallbladder involvement  - Examined throat; noted rawness likely related to allergies    HYPERTENSION:  - Continued amlodipine for blood pressure management.  - Prescribed a 30-day supply of amlodipine with 6 refills.    ANXIETY:  - Extended Xanax prescription from 1 month to 3 months.    GASTROINTESTINAL ISSUES:  - Educated the patient about the difference between C. diff and other GI issues.  - Discussed H. pylori testing methods and treatment approach.  - Provided information on gallbladder-related pain patterns.  - Instructed the patient to monitor abdominal discomfort, especially after fatty meals.  - Recommend continuing use of digestive enzymes before meals.  - Will consider ultrasound if abdominal pain persists or worsens.  - Advised the patient to contact the office if abdominal pain becomes persistent or more frequent.  - Nataly reports ongoing digestive issues, described as a 'nervous stomach'.  - Noted improvement with digestive enzymes.    ALLERGIES:  - Examined the patient's throat and found it non-infectious.  - Attributed throat symptoms to allergies.  - Advised the patient to continue allergy medication.  - Nataly reports usual raw throat sensation.  - Nataly to continue taking allergy medications.    COLON CANCER SCREENING:  - Reviewed previous colonoscopy results from 2019.  - Recommend following up on colonoscopy in 5 years from 2019 or as needed.  - Instructed to clarify exact date of last colonoscopy and confirm 10-year follow-up is appropriate.  - Nataly reports family history of colon cancer in great grandfather.  - Nataly reports family history of polyps in sister and mother.    BREAST CANCER SCREENING:  - Instructed to obtain information about last mammogram from previous provider.    HYPERLIPIDEMIA:  - Lipid panel ordered (to be added to August labs or ordered separately  through [facility]).    MEDICATIONS/SUPPLEMENTS:  - Refilled other medications, organizing them between [facility], [facility], and [facility] pharmacies.    FOLLOW UP:  - Follow up in August for labs, including lipid panel.        Plan:       1. ADHD, predominantly inattentive type  Comments:  adderall 30  Orders:  -     dextroamphetamine-amphetamine (ADDERALL) 30 mg Tab; Take 1 tablet (30 mg total) by mouth Daily.  Dispense: 30 tablet; Refill: 0  -     dextroamphetamine-amphetamine (ADDERALL) 30 mg Tab; Take 1 tablet (30 mg total) by mouth Daily.  Dispense: 30 tablet; Refill: 0  -     dextroamphetamine-amphetamine (ADDERALL) 30 mg Tab; Take 1 tablet (30 mg total) by mouth Daily.  Dispense: 30 tablet; Refill: 0    2. Fibromyalgia  Comments:  discussed  Orders:  -     omeprazole (PRILOSEC) 40 MG capsule; Take 1 capsule (40 mg total) by mouth once daily.  Dispense: 90 capsule; Refill: 3  -     traMADoL (ULTRAM) 50 mg tablet; Take 1 tablet (50 mg total) by mouth every 6 (six) hours.  Dispense: 30 tablet; Refill: 0  -     montelukast (SINGULAIR) 10 mg tablet; Take 1 tablet (10 mg total) by mouth every evening.  Dispense: 30 tablet; Refill: 5  -     lisinopriL (PRINIVIL,ZESTRIL) 20 MG tablet; Take 1 tablet (20 mg total) by mouth once daily.  Dispense: 30 tablet; Refill: 5    3. Encounter for therapeutic drug monitoring    4. Encounter for long-term (current) use of medications    5. High risk medication use  -     omeprazole (PRILOSEC) 40 MG capsule; Take 1 capsule (40 mg total) by mouth once daily.  Dispense: 90 capsule; Refill: 3  -     montelukast (SINGULAIR) 10 mg tablet; Take 1 tablet (10 mg total) by mouth every evening.  Dispense: 30 tablet; Refill: 5  -     lisinopriL (PRINIVIL,ZESTRIL) 20 MG tablet; Take 1 tablet (20 mg total) by mouth once daily.  Dispense: 30 tablet; Refill: 5    6. Anxiety  Comments:  xanax prn  Orders:  -     ALPRAZolam (XANAX) 1 MG tablet; Take 1 tablet (1 mg total) by mouth 2 (two) times daily as needed for  Anxiety.  Dispense: 60 tablet; Refill: 2    7. Hypertension, unspecified type  Comments:  bp in office controlled  Orders:  -     lisinopriL (PRINIVIL,ZESTRIL) 20 MG tablet; Take 1 tablet (20 mg total) by mouth once daily.  Dispense: 30 tablet; Refill: 5    8. Insomnia, unspecified type  Comments:  ambien 10  Orders:  -     zolpidem (AMBIEN) 10 mg Tab; 1 tablet at bedtime  Dispense: 90 tablet; Refill: 1    9. Gastroesophageal reflux disease, unspecified whether esophagitis present    10. Allergic rhinitis due to pollen, unspecified seasonality  Comments:  continue otc meds    11. Sjogren's syndrome with keratoconjunctivitis sicca  Comments:  being followed by rheumatology    12. Neuropathy  Comments:  gabapentin    Other orders  -     amLODIPine (NORVASC) 10 MG tablet; Take 0.5 tablets (5 mg total) by mouth 2 (two) times daily.  Dispense: 30 tablet; Refill: 5  -     gabapentin (NEURONTIN) 300 MG capsule; Take 1 capsule (300 mg total) by mouth 3 (three) times daily.  Dispense: 270 capsule; Refill: 1  -     methocarbamoL (ROBAXIN) 500 MG Tab; Take 1 tablet (500 mg total) by mouth 3 (three) times daily.  Dispense: 270 tablet; Refill: 1      Follow up in about 3 months (around 6/11/2025), or if symptoms worsen or fail to improve, for medication management.          Counseled on age and gender appropriate medical preventative services, including cancer screenings, immunizations, overall nutritional health, need for a consistent exercise regimen and an overall push towards maintaining a vigorous and active lifestyle.      This note was generated with the assistance of ambient listening technology. Verbal consent was obtained by the patient and accompanying visitor(s) for the recording of patient appointment to facilitate this note. I attest to having reviewed and edited the generated note for accuracy, though some syntax or spelling errors may persist. Please contact the author of this note for any clarification.        3/18/2025 Trinity Alonoz NP         [1]   Current Outpatient Medications:     fexofenadine (ALLEGRA) 180 MG tablet, Take 180 mg by mouth once daily., Disp: , Rfl:     fluticasone propionate (FLONASE) 50 mcg/actuation nasal spray, 1 spray (50 mcg total) by Each Nostril route once daily., Disp: 16 g, Rfl: 3    HYDROcodone-acetaminophen (NORCO) 5-325 mg per tablet, Take 1 tablet by mouth every 12 (twelve) hours as needed for Pain., Disp: 60 tablet, Rfl: 0    melatonin 5 mg Cap, Take by mouth., Disp: , Rfl:     multivitamin capsule, Take 1 capsule by mouth., Disp: , Rfl:     albuterol (VENTOLIN HFA) 90 mcg/actuation inhaler, Inhale 2 puffs into the lungs every 6 (six) hours as needed for Wheezing. Rescue, Disp: 18 g, Rfl: 0    ALPRAZolam (XANAX) 1 MG tablet, Take 1 tablet (1 mg total) by mouth 2 (two) times daily as needed for Anxiety., Disp: 60 tablet, Rfl: 2    amLODIPine (NORVASC) 10 MG tablet, Take 0.5 tablets (5 mg total) by mouth 2 (two) times daily., Disp: 30 tablet, Rfl: 5    dextroamphetamine-amphetamine (ADDERALL) 30 mg Tab, Take 1 tablet (30 mg total) by mouth Daily., Disp: 30 tablet, Rfl: 0    [START ON 4/10/2025] dextroamphetamine-amphetamine (ADDERALL) 30 mg Tab, Take 1 tablet (30 mg total) by mouth Daily., Disp: 30 tablet, Rfl: 0    [START ON 5/10/2025] dextroamphetamine-amphetamine (ADDERALL) 30 mg Tab, Take 1 tablet (30 mg total) by mouth Daily., Disp: 30 tablet, Rfl: 0    gabapentin (NEURONTIN) 300 MG capsule, Take 1 capsule (300 mg total) by mouth 3 (three) times daily., Disp: 270 capsule, Rfl: 1    lisinopriL (PRINIVIL,ZESTRIL) 20 MG tablet, Take 1 tablet (20 mg total) by mouth once daily., Disp: 30 tablet, Rfl: 5    methocarbamoL (ROBAXIN) 500 MG Tab, Take 1 tablet (500 mg total) by mouth 3 (three) times daily., Disp: 270 tablet, Rfl: 1    montelukast (SINGULAIR) 10 mg tablet, Take 1 tablet (10 mg total) by mouth every evening., Disp: 30 tablet, Rfl: 5    omeprazole (PRILOSEC) 40 MG capsule, Take  1 capsule (40 mg total) by mouth once daily., Disp: 90 capsule, Rfl: 3    traMADoL (ULTRAM) 50 mg tablet, Take 1 tablet (50 mg total) by mouth every 6 (six) hours., Disp: 30 tablet, Rfl: 0    zolpidem (AMBIEN) 10 mg Tab, 1 tablet at bedtime, Disp: 90 tablet, Rfl: 1

## 2025-03-27 DIAGNOSIS — M79.7 FIBROMYALGIA: ICD-10-CM

## 2025-03-27 DIAGNOSIS — Z79.899 HIGH RISK MEDICATION USE: ICD-10-CM

## 2025-03-27 DIAGNOSIS — F41.9 ANXIETY: ICD-10-CM

## 2025-03-27 RX ORDER — HYDROCODONE BITARTRATE AND ACETAMINOPHEN 5; 325 MG/1; MG/1
1 TABLET ORAL EVERY 12 HOURS PRN
Qty: 60 TABLET | Refills: 0 | Status: SHIPPED | OUTPATIENT
Start: 2025-03-27

## 2025-03-27 RX ORDER — OMEPRAZOLE 40 MG/1
40 CAPSULE, DELAYED RELEASE ORAL DAILY
Qty: 90 CAPSULE | Refills: 3 | Status: SHIPPED | OUTPATIENT
Start: 2025-03-27 | End: 2025-03-27

## 2025-03-27 RX ORDER — OMEPRAZOLE 40 MG/1
40 CAPSULE, DELAYED RELEASE ORAL DAILY
Qty: 90 CAPSULE | Refills: 3 | Status: SHIPPED | OUTPATIENT
Start: 2025-03-27 | End: 2026-03-27

## 2025-03-27 RX ORDER — ALPRAZOLAM 1 MG/1
1 TABLET ORAL 2 TIMES DAILY PRN
Qty: 60 TABLET | Refills: 2 | Status: SHIPPED | OUTPATIENT
Start: 2025-03-27

## 2025-03-27 NOTE — TELEPHONE ENCOUNTER
Spoke pt and she needs Hydrocodone to [facility] and Omeprazole to [facility]     Wants Xanax for a 30 months supply. 180 instead of 60 if possible - [facility]

## 2025-03-27 NOTE — TELEPHONE ENCOUNTER
----- Message from Jenifer sent at 3/27/2025  8:32 AM CDT -----  - 8:17- pt has some questions about rx's that did not get called in [phone]

## 2025-04-07 ENCOUNTER — TELEPHONE (OUTPATIENT)
Dept: FAMILY MEDICINE | Facility: CLINIC | Age: 60
End: 2025-04-07

## 2025-04-07 NOTE — TELEPHONE ENCOUNTER
Pt stated she is having a flare up in her back and causing back pain . Stated Trinity knows what she has and medrol helps a lot when it happens. Asking for pack

## 2025-04-07 NOTE — TELEPHONE ENCOUNTER
----- Message from Key sent at 4/7/2025  1:09 PM CDT -----  Vm:[id number] stated that she is having a flair up and is in a lot of pain, so she would like a medrol dosepak.[phone]

## 2025-04-08 RX ORDER — METHYLPREDNISOLONE 4 MG/1
TABLET ORAL
Qty: 21 EACH | Refills: 0 | Status: SHIPPED | OUTPATIENT
Start: 2025-04-08 | End: 2025-04-29

## 2025-06-04 DIAGNOSIS — F90.0 ADHD, PREDOMINANTLY INATTENTIVE TYPE: ICD-10-CM

## 2025-06-04 RX ORDER — DEXTROAMPHETAMINE SACCHARATE, AMPHETAMINE ASPARTATE, DEXTROAMPHETAMINE SULFATE AND AMPHETAMINE SULFATE 7.5; 7.5; 7.5; 7.5 MG/1; MG/1; MG/1; MG/1
1 TABLET ORAL DAILY
Qty: 30 TABLET | Refills: 0 | Status: SHIPPED | OUTPATIENT
Start: 2025-06-04